# Patient Record
Sex: FEMALE | Race: WHITE
[De-identification: names, ages, dates, MRNs, and addresses within clinical notes are randomized per-mention and may not be internally consistent; named-entity substitution may affect disease eponyms.]

---

## 2018-07-18 ENCOUNTER — HOSPITAL ENCOUNTER (OUTPATIENT)
Age: 38
End: 2018-07-18
Payer: MEDICAID

## 2018-07-18 DIAGNOSIS — Z12.4: Primary | ICD-10-CM

## 2018-07-18 DIAGNOSIS — Z12.72: ICD-10-CM

## 2018-07-18 PROCEDURE — 88175 CYTOPATH C/V AUTO FLUID REDO: CPT

## 2018-07-18 PROCEDURE — G0145 SCR C/V CYTO,THINLAYER,RESCR: HCPCS

## 2019-05-16 LAB
ANION GAP: 3 (ref 5–15)
APTT PPP: 31 SECONDS (ref 24.1–36.2)
B-HCG SERPL QL: NEGATIVE NEGATIVE
BUN SERPL-MCNC: 8 MG/DL (ref 7–18)
BUN/CREAT RATIO: 10.8 RATIO (ref 10–20)
CALCIUM SERPL-MCNC: 8.5 MG/DL (ref 8.5–10.1)
CARBON DIOXIDE: 26 MMOL/L (ref 21–32)
CHLORIDE: 109 MMOL/L (ref 98–107)
DEPRECATED RDW RBC: 44 FL (ref 35.1–43.9)
ERYTHROCYTE [DISTWIDTH] IN BLOOD: 12.8 % (ref 11.6–14.6)
EST GLOM FILT RATE - AFR AMER: 111 ML/MIN (ref 60–?)
GLUCOSE: 79 MG/DL (ref 74–106)
HCG SERPL QL: NEGATIVE NEGATIVE
HCT VFR BLD AUTO: 42.8 % (ref 37–47)
HEMOGLOBIN: 14.6 G/DL (ref 12–15)
HGB BLD-MCNC: 14.6 G/DL (ref 12–15)
INTERNAL QC VALIDATED?: (no result)
MCV RBC: 94.1 FL (ref 81–99)
MEAN CORP HGB CONC: 34.1 G/GL (ref 32–36)
MEAN PLATELET VOL.: 10.3 FL (ref 6.2–12)
PLATELET # BLD: 230 K/MM3 (ref 150–450)
PLATELET COUNT: 230 K/MM3 (ref 150–450)
POTASSIUM: 3.8 MMOL/L (ref 3.5–5.1)
PROTHROMBIN TIME (PROTIME)PT.: 13.5 SECONDS (ref 11.7–14.9)
RBC # BLD AUTO: 4.55 M/MM3 (ref 4.2–5.4)
RBC DISTRIBUTION WIDTH CV: 12.8 % (ref 11.6–14.6)
RBC DISTRIBUTION WIDTH SD: 44 FL (ref 35.1–43.9)
SCAN INDICATED ON CBC? Y/N: NO
WBC # BLD AUTO: 10.4 K/MM3 (ref 4.4–11)
WHITE BLOOD COUNT: 10.4 K/MM3 (ref 4.4–11)

## 2019-05-22 ENCOUNTER — HOSPITAL ENCOUNTER (OUTPATIENT)
Dept: HOSPITAL 100 - MS3 | Age: 39
Setting detail: OBSERVATION
LOS: 1 days | Discharge: HOME | End: 2019-05-23
Payer: MEDICAID

## 2019-05-22 VITALS
RESPIRATION RATE: 16 BRPM | SYSTOLIC BLOOD PRESSURE: 90 MMHG | HEART RATE: 55 BPM | OXYGEN SATURATION: 100 % | DIASTOLIC BLOOD PRESSURE: 50 MMHG

## 2019-05-22 VITALS
HEART RATE: 62 BPM | SYSTOLIC BLOOD PRESSURE: 84 MMHG | DIASTOLIC BLOOD PRESSURE: 64 MMHG | RESPIRATION RATE: 16 BRPM | TEMPERATURE: 97.7 F | OXYGEN SATURATION: 94 %

## 2019-05-22 VITALS
RESPIRATION RATE: 16 BRPM | HEART RATE: 48 BPM | SYSTOLIC BLOOD PRESSURE: 90 MMHG | DIASTOLIC BLOOD PRESSURE: 64 MMHG | OXYGEN SATURATION: 100 %

## 2019-05-22 VITALS
RESPIRATION RATE: 16 BRPM | DIASTOLIC BLOOD PRESSURE: 64 MMHG | OXYGEN SATURATION: 100 % | HEART RATE: 60 BPM | SYSTOLIC BLOOD PRESSURE: 90 MMHG

## 2019-05-22 VITALS
SYSTOLIC BLOOD PRESSURE: 90 MMHG | OXYGEN SATURATION: 100 % | HEART RATE: 62 BPM | RESPIRATION RATE: 16 BRPM | DIASTOLIC BLOOD PRESSURE: 64 MMHG | TEMPERATURE: 98.8 F

## 2019-05-22 VITALS
TEMPERATURE: 97.52 F | RESPIRATION RATE: 16 BRPM | OXYGEN SATURATION: 99 % | HEART RATE: 57 BPM | DIASTOLIC BLOOD PRESSURE: 58 MMHG | SYSTOLIC BLOOD PRESSURE: 94 MMHG

## 2019-05-22 VITALS
RESPIRATION RATE: 16 BRPM | OXYGEN SATURATION: 16 % | HEART RATE: 40 BPM | SYSTOLIC BLOOD PRESSURE: 73 MMHG | DIASTOLIC BLOOD PRESSURE: 47 MMHG | TEMPERATURE: 96.8 F

## 2019-05-22 VITALS
DIASTOLIC BLOOD PRESSURE: 48 MMHG | OXYGEN SATURATION: 100 % | HEART RATE: 52 BPM | RESPIRATION RATE: 16 BRPM | SYSTOLIC BLOOD PRESSURE: 85 MMHG

## 2019-05-22 VITALS
RESPIRATION RATE: 16 BRPM | OXYGEN SATURATION: 98 % | DIASTOLIC BLOOD PRESSURE: 48 MMHG | TEMPERATURE: 98.2 F | HEART RATE: 50 BPM | SYSTOLIC BLOOD PRESSURE: 81 MMHG

## 2019-05-22 VITALS
RESPIRATION RATE: 20 BRPM | OXYGEN SATURATION: 99 % | HEART RATE: 60 BPM | TEMPERATURE: 97.7 F | SYSTOLIC BLOOD PRESSURE: 87 MMHG | DIASTOLIC BLOOD PRESSURE: 50 MMHG

## 2019-05-22 VITALS
OXYGEN SATURATION: 97 % | HEART RATE: 44 BPM | SYSTOLIC BLOOD PRESSURE: 83 MMHG | TEMPERATURE: 98.24 F | DIASTOLIC BLOOD PRESSURE: 50 MMHG | RESPIRATION RATE: 16 BRPM

## 2019-05-22 VITALS — BODY MASS INDEX: 19.5 KG/M2

## 2019-05-22 DIAGNOSIS — N83.11: ICD-10-CM

## 2019-05-22 DIAGNOSIS — N83.12: ICD-10-CM

## 2019-05-22 DIAGNOSIS — N88.8: ICD-10-CM

## 2019-05-22 DIAGNOSIS — G89.29: ICD-10-CM

## 2019-05-22 DIAGNOSIS — K21.9: ICD-10-CM

## 2019-05-22 DIAGNOSIS — Z79.899: ICD-10-CM

## 2019-05-22 DIAGNOSIS — E78.00: ICD-10-CM

## 2019-05-22 DIAGNOSIS — N80.0: Primary | ICD-10-CM

## 2019-05-22 DIAGNOSIS — F17.200: ICD-10-CM

## 2019-05-22 LAB — INTERNAL QC VALIDATED?: (no result)

## 2019-05-22 PROCEDURE — 84703 CHORIONIC GONADOTROPIN ASSAY: CPT

## 2019-05-22 PROCEDURE — 86900 BLOOD TYPING SEROLOGIC ABO: CPT

## 2019-05-22 PROCEDURE — 58552 LAPARO-VAG HYST INCL T/O: CPT

## 2019-05-22 PROCEDURE — 96361 HYDRATE IV INFUSION ADD-ON: CPT

## 2019-05-22 PROCEDURE — 99218: CPT

## 2019-05-22 PROCEDURE — 93005 ELECTROCARDIOGRAM TRACING: CPT

## 2019-05-22 PROCEDURE — 81025 URINE PREGNANCY TEST: CPT

## 2019-05-22 PROCEDURE — 96365 THER/PROPH/DIAG IV INF INIT: CPT

## 2019-05-22 PROCEDURE — 82565 ASSAY OF CREATININE: CPT

## 2019-05-22 PROCEDURE — 96366 THER/PROPH/DIAG IV INF ADDON: CPT

## 2019-05-22 PROCEDURE — 80048 BASIC METABOLIC PNL TOTAL CA: CPT

## 2019-05-22 PROCEDURE — 85730 THROMBOPLASTIN TIME PARTIAL: CPT

## 2019-05-22 PROCEDURE — G0379 DIRECT REFER HOSPITAL OBSERV: HCPCS

## 2019-05-22 PROCEDURE — 88307 TISSUE EXAM BY PATHOLOGIST: CPT

## 2019-05-22 PROCEDURE — G0378 HOSPITAL OBSERVATION PER HR: HCPCS

## 2019-05-22 PROCEDURE — 86850 RBC ANTIBODY SCREEN: CPT

## 2019-05-22 PROCEDURE — 85610 PROTHROMBIN TIME: CPT

## 2019-05-22 PROCEDURE — 99406 BEHAV CHNG SMOKING 3-10 MIN: CPT

## 2019-05-22 PROCEDURE — 36415 COLL VENOUS BLD VENIPUNCTURE: CPT

## 2019-05-22 PROCEDURE — 96376 TX/PRO/DX INJ SAME DRUG ADON: CPT

## 2019-05-22 PROCEDURE — 96375 TX/PRO/DX INJ NEW DRUG ADDON: CPT

## 2019-05-22 PROCEDURE — 0UT9FZZ RESECTION OF UTERUS, VIA NATURAL OR ARTIFICIAL OPENING WITH PERCUTANEOUS ENDOSCOPIC ASSISTANCE: ICD-10-PCS | Performed by: OBSTETRICS & GYNECOLOGY

## 2019-05-22 PROCEDURE — 85027 COMPLETE CBC AUTOMATED: CPT

## 2019-05-22 RX ADMIN — KETOROLAC TROMETHAMINE 30 MG: 30 INJECTION, SOLUTION INTRAMUSCULAR; INTRAVENOUS at 18:00

## 2019-05-22 RX ADMIN — CEFAZOLIN SODIUM 150 GM: 1 INJECTION, SOLUTION INTRAVENOUS at 16:35

## 2019-05-22 RX ADMIN — ORPHENADRINE CITRATE 100 MG: 100 TABLET, EXTENDED RELEASE ORAL at 22:18

## 2019-05-22 RX ADMIN — KETOROLAC TROMETHAMINE 30 MG: 30 INJECTION, SOLUTION INTRAMUSCULAR; INTRAVENOUS at 11:58

## 2019-05-23 VITALS
RESPIRATION RATE: 18 BRPM | OXYGEN SATURATION: 97 % | DIASTOLIC BLOOD PRESSURE: 51 MMHG | SYSTOLIC BLOOD PRESSURE: 91 MMHG | TEMPERATURE: 98.6 F | HEART RATE: 43 BPM

## 2019-05-23 VITALS
SYSTOLIC BLOOD PRESSURE: 84 MMHG | HEART RATE: 45 BPM | TEMPERATURE: 98.2 F | DIASTOLIC BLOOD PRESSURE: 47 MMHG | OXYGEN SATURATION: 96 % | RESPIRATION RATE: 18 BRPM

## 2019-05-23 VITALS
RESPIRATION RATE: 18 BRPM | HEART RATE: 42 BPM | TEMPERATURE: 98.06 F | SYSTOLIC BLOOD PRESSURE: 86 MMHG | DIASTOLIC BLOOD PRESSURE: 48 MMHG | OXYGEN SATURATION: 99 %

## 2019-05-23 VITALS — OXYGEN SATURATION: 96 %

## 2019-05-23 VITALS — HEART RATE: 42 BPM

## 2019-05-23 LAB
DEPRECATED RDW RBC: 40.7 FL (ref 35.1–43.9)
ERYTHROCYTE [DISTWIDTH] IN BLOOD: 12.4 % (ref 11.6–14.6)
EST GLOM FILT RATE - AFR AMER: 102 ML/MIN (ref 60–?)
ESTIMATED CREATININE CLEARANCE: 81.16 ML/MIN
HCT VFR BLD AUTO: 34.3 % (ref 37–47)
HEMOGLOBIN: 11.5 G/DL (ref 12–15)
HGB BLD-MCNC: 11.5 G/DL (ref 12–15)
MCV RBC: 93 FL (ref 81–99)
MEAN CORP HGB CONC: 33.5 G/GL (ref 32–36)
MEAN PLATELET VOL.: 11.6 FL (ref 6.2–12)
PLATELET # BLD: 145 K/MM3 (ref 150–450)
PLATELET COUNT: 145 K/MM3 (ref 150–450)
RBC # BLD AUTO: 3.69 M/MM3 (ref 4.2–5.4)
RBC DISTRIBUTION WIDTH CV: 12.4 % (ref 11.6–14.6)
RBC DISTRIBUTION WIDTH SD: 40.7 FL (ref 35.1–43.9)
SCAN INDICATED ON CBC? Y/N: NO
WBC # BLD AUTO: 18.1 K/MM3 (ref 4.4–11)
WHITE BLOOD COUNT: 18.1 K/MM3 (ref 4.4–11)

## 2019-05-23 RX ADMIN — CEFAZOLIN SODIUM 150 GM: 1 INJECTION, SOLUTION INTRAVENOUS at 01:13

## 2019-05-23 RX ADMIN — KETOROLAC TROMETHAMINE 30 MG: 30 INJECTION, SOLUTION INTRAMUSCULAR; INTRAVENOUS at 05:11

## 2019-05-23 RX ADMIN — ESTROGENS, CONJUGATED 1.25 MG: 1.25 TABLET, FILM COATED ORAL at 10:50

## 2019-05-23 RX ADMIN — KETOROLAC TROMETHAMINE 30 MG: 30 INJECTION, SOLUTION INTRAMUSCULAR; INTRAVENOUS at 00:57

## 2019-05-30 ENCOUNTER — HOSPITAL ENCOUNTER (OUTPATIENT)
Age: 39
End: 2019-05-30
Payer: MEDICAID

## 2019-05-30 VITALS — BODY MASS INDEX: 19.5 KG/M2

## 2019-05-30 DIAGNOSIS — R30.0: Primary | ICD-10-CM

## 2019-05-30 PROCEDURE — 87086 URINE CULTURE/COLONY COUNT: CPT

## 2021-08-24 ENCOUNTER — HOSPITAL ENCOUNTER (OUTPATIENT)
Age: 41
Discharge: HOME | End: 2021-08-24
Payer: MEDICARE

## 2021-08-24 DIAGNOSIS — N76.0: Primary | ICD-10-CM

## 2021-08-24 DIAGNOSIS — Z11.3: ICD-10-CM

## 2021-08-24 DIAGNOSIS — N76.1: ICD-10-CM

## 2022-04-01 ENCOUNTER — HOSPITAL ENCOUNTER (OUTPATIENT)
Age: 42
Discharge: HOME | End: 2022-04-01
Payer: MEDICARE

## 2022-04-01 DIAGNOSIS — N77.1: ICD-10-CM

## 2022-04-01 DIAGNOSIS — Z11.3: Primary | ICD-10-CM

## 2022-07-18 ENCOUNTER — HOSPITAL ENCOUNTER (OUTPATIENT)
Age: 42
Discharge: HOME | End: 2022-07-18
Payer: MEDICARE

## 2022-07-18 DIAGNOSIS — R30.0: Primary | ICD-10-CM

## 2022-07-18 DIAGNOSIS — N76.1: ICD-10-CM

## 2022-07-18 PROCEDURE — 87086 URINE CULTURE/COLONY COUNT: CPT

## 2022-07-18 PROCEDURE — 87088 URINE BACTERIA CULTURE: CPT

## 2022-08-24 ENCOUNTER — HOSPITAL ENCOUNTER (OUTPATIENT)
Age: 42
Discharge: HOME | End: 2022-08-24
Payer: MEDICARE

## 2022-08-24 DIAGNOSIS — N76.0: Primary | ICD-10-CM

## 2023-06-12 ENCOUNTER — OFFICE VISIT (OUTPATIENT)
Dept: PRIMARY CARE | Facility: CLINIC | Age: 43
End: 2023-06-12
Payer: MEDICARE

## 2023-06-12 ENCOUNTER — LAB (OUTPATIENT)
Dept: LAB | Facility: LAB | Age: 43
End: 2023-06-12
Payer: MEDICARE

## 2023-06-12 VITALS
SYSTOLIC BLOOD PRESSURE: 122 MMHG | DIASTOLIC BLOOD PRESSURE: 70 MMHG | WEIGHT: 109.9 LBS | HEIGHT: 66 IN | HEART RATE: 83 BPM | BODY MASS INDEX: 17.66 KG/M2

## 2023-06-12 DIAGNOSIS — E46 PROTEIN-CALORIE MALNUTRITION, UNSPECIFIED SEVERITY (MULTI): ICD-10-CM

## 2023-06-12 DIAGNOSIS — Z86.39 H/O THYROID NODULE: ICD-10-CM

## 2023-06-12 DIAGNOSIS — G89.29 CHRONIC BILATERAL THORACIC BACK PAIN: ICD-10-CM

## 2023-06-12 DIAGNOSIS — N95.1 HOT FLASHES, MENOPAUSAL: Primary | ICD-10-CM

## 2023-06-12 DIAGNOSIS — Z90.710 STATUS POST TOTAL HYSTERECTOMY: ICD-10-CM

## 2023-06-12 DIAGNOSIS — I49.5 SICK SINUS SYNDROME DUE TO SA NODE DYSFUNCTION (MULTI): ICD-10-CM

## 2023-06-12 DIAGNOSIS — M54.6 CHRONIC BILATERAL THORACIC BACK PAIN: ICD-10-CM

## 2023-06-12 DIAGNOSIS — N95.1 HOT FLASHES, MENOPAUSAL: ICD-10-CM

## 2023-06-12 PROBLEM — Q21.12 PFO (PATENT FORAMEN OVALE) (HHS-HCC): Status: ACTIVE | Noted: 2023-06-12

## 2023-06-12 PROBLEM — K52.9 COLITIS: Status: ACTIVE | Noted: 2023-06-12

## 2023-06-12 PROBLEM — I63.81 LACUNAR INFARCTION (MULTI): Status: ACTIVE | Noted: 2023-06-12

## 2023-06-12 PROBLEM — R11.2 NAUSEA AND VOMITING: Status: ACTIVE | Noted: 2023-06-12

## 2023-06-12 PROBLEM — R00.1 BRADYCARDIA: Status: ACTIVE | Noted: 2023-06-12

## 2023-06-12 PROBLEM — R00.2 HEART PALPITATIONS: Status: ACTIVE | Noted: 2023-06-12

## 2023-06-12 PROBLEM — R53.83 FATIGUE: Status: ACTIVE | Noted: 2023-06-12

## 2023-06-12 PROBLEM — M23.90 DERANGEMENT OF KNEE: Status: ACTIVE | Noted: 2023-06-12

## 2023-06-12 PROBLEM — K76.9 LIVER LESION: Status: ACTIVE | Noted: 2023-06-12

## 2023-06-12 PROBLEM — K21.9 GERD (GASTROESOPHAGEAL REFLUX DISEASE): Status: ACTIVE | Noted: 2023-06-12

## 2023-06-12 PROBLEM — Z95.818 STATUS POST PLACEMENT OF IMPLANTABLE LOOP RECORDER: Status: ACTIVE | Noted: 2023-06-12

## 2023-06-12 PROBLEM — M79.7 FIBROMYALGIA: Status: ACTIVE | Noted: 2023-06-12

## 2023-06-12 PROBLEM — G96.198 SPINAL ARACHNOID CYST: Status: ACTIVE | Noted: 2023-06-12

## 2023-06-12 PROBLEM — R89.9 ABNORMAL LABORATORY TEST: Status: ACTIVE | Noted: 2023-06-12

## 2023-06-12 PROBLEM — B00.1 RECURRENT COLD SORES: Status: ACTIVE | Noted: 2023-06-12

## 2023-06-12 PROBLEM — B37.31 YEAST VAGINITIS: Status: ACTIVE | Noted: 2023-06-12

## 2023-06-12 PROBLEM — I67.9 CEREBROVASCULAR DISEASE: Status: ACTIVE | Noted: 2023-06-12

## 2023-06-12 PROBLEM — G47.19 EXCESSIVE DAYTIME SLEEPINESS: Status: ACTIVE | Noted: 2023-06-12

## 2023-06-12 PROBLEM — M51.26 LUMBAR HERNIATED DISC: Status: ACTIVE | Noted: 2023-06-12

## 2023-06-12 PROBLEM — K59.09 CHRONIC CONSTIPATION: Status: ACTIVE | Noted: 2023-06-12

## 2023-06-12 PROBLEM — M48.02 SPINAL STENOSIS OF CERVICAL REGION: Status: ACTIVE | Noted: 2023-06-12

## 2023-06-12 PROBLEM — E04.1 THYROID NODULE: Status: ACTIVE | Noted: 2023-06-12

## 2023-06-12 PROBLEM — M54.9 BACK PAIN: Status: ACTIVE | Noted: 2023-06-12

## 2023-06-12 PROBLEM — F17.200 SMOKER: Status: ACTIVE | Noted: 2023-06-12

## 2023-06-12 LAB
ALANINE AMINOTRANSFERASE (SGPT) (U/L) IN SER/PLAS: 18 U/L (ref 7–45)
ALBUMIN (G/DL) IN SER/PLAS: 4.7 G/DL (ref 3.4–5)
ALKALINE PHOSPHATASE (U/L) IN SER/PLAS: 80 U/L (ref 33–110)
ANION GAP IN SER/PLAS: 10 MMOL/L (ref 10–20)
ASPARTATE AMINOTRANSFERASE (SGOT) (U/L) IN SER/PLAS: 20 U/L (ref 9–39)
BILIRUBIN TOTAL (MG/DL) IN SER/PLAS: 0.7 MG/DL (ref 0–1.2)
CALCIUM (MG/DL) IN SER/PLAS: 9.2 MG/DL (ref 8.6–10.3)
CARBON DIOXIDE, TOTAL (MMOL/L) IN SER/PLAS: 26 MMOL/L (ref 21–32)
CHLORIDE (MMOL/L) IN SER/PLAS: 109 MMOL/L (ref 98–107)
CREATININE (MG/DL) IN SER/PLAS: 0.79 MG/DL (ref 0.5–1.05)
GFR FEMALE: >90 ML/MIN/1.73M2
GLUCOSE (MG/DL) IN SER/PLAS: 94 MG/DL (ref 74–99)
POTASSIUM (MMOL/L) IN SER/PLAS: 3.7 MMOL/L (ref 3.5–5.3)
PROTEIN TOTAL: 6.8 G/DL (ref 6.4–8.2)
SODIUM (MMOL/L) IN SER/PLAS: 141 MMOL/L (ref 136–145)
THYROTROPIN (MIU/L) IN SER/PLAS BY DETECTION LIMIT <= 0.05 MIU/L: 1.14 MIU/L (ref 0.44–3.98)
UREA NITROGEN (MG/DL) IN SER/PLAS: 14 MG/DL (ref 6–23)

## 2023-06-12 PROCEDURE — 99214 OFFICE O/P EST MOD 30 MIN: CPT | Performed by: NURSE PRACTITIONER

## 2023-06-12 PROCEDURE — 80053 COMPREHEN METABOLIC PANEL: CPT

## 2023-06-12 PROCEDURE — 84443 ASSAY THYROID STIM HORMONE: CPT

## 2023-06-12 PROCEDURE — 84146 ASSAY OF PROLACTIN: CPT

## 2023-06-12 PROCEDURE — 36415 COLL VENOUS BLD VENIPUNCTURE: CPT

## 2023-06-12 PROCEDURE — 83001 ASSAY OF GONADOTROPIN (FSH): CPT

## 2023-06-12 RX ORDER — PAROXETINE 10 MG/1
10 TABLET, FILM COATED ORAL EVERY MORNING
Qty: 30 TABLET | Refills: 5 | Status: SHIPPED | OUTPATIENT
Start: 2023-06-12 | End: 2024-01-23 | Stop reason: WASHOUT

## 2023-06-12 ASSESSMENT — ENCOUNTER SYMPTOMS: ACTIVITY CHANGE: 0

## 2023-06-12 NOTE — PROGRESS NOTES
"Subjective   Patient ID: Rosalba Jenkins is a 42 y.o. female who presents for Hot Flashes.    Hot flashes: Complete hysterectomy 5 years ago. Was initially started on hormone replacement however she forgot to take it. She was asymptomatic so she never took it  Complains of hot/burning feeling with any activity; happening at night preventing her form sleeping.  Having fatigue: unable to sleep  Has \"terrible back problem\"  Is currently on disability for back pain: was seeing a neurosurgeon in [place]. \"He left\"  Has not seen neuro surgery since 2021  Has documents showing left nodule of her thyroids    Today here for multiple complains  She complains of pain, hot flashes, difficulty walking, difficulty sleeping, chronic back pain  Reviewed MRI form 2019 of L-spine, C-spine, and T-spine. She will need to reestablish with a neurosurgeon, she reports hasn't seen one in a few years    Had loop recorder in place, follows with cardiology in [place]  Follows with GYN in [place], has recurring BV      H/O low potassium: unknown cause         Review of Systems   Constitutional:  Positive for appetite change (reports poor appetite) and fatigue. Negative for activity change and fever.   HENT: Negative.     Eyes:  Negative for visual disturbance.   Respiratory:  Negative for cough, chest tightness, shortness of breath and wheezing.    Cardiovascular:  Negative for chest pain, palpitations and leg swelling.   Gastrointestinal:  Negative for constipation, diarrhea, nausea and vomiting.   Endocrine: Negative for cold intolerance, heat intolerance, polydipsia, polyphagia and polyuria.   Genitourinary:  Negative for difficulty urinating, pelvic pain and vaginal discharge.   Musculoskeletal:  Positive for arthralgias, back pain and myalgias. Negative for neck stiffness.   Skin: Negative.    Neurological:  Negative for dizziness, weakness, light-headedness, numbness and headaches.   Psychiatric/Behavioral:  The patient is " "nervous/anxious.        Objective   /70 (Patient Position: Sitting)   Pulse 83   Ht 1.664 m (5' 5.5\")   Wt 49.9 kg (109 lb 14.4 oz)   BMI 18.01 kg/m²     Physical Exam  Constitutional:       General: She is not in acute distress.     Appearance: Normal appearance.      Comments: Appears underweight     HENT:      Head: Normocephalic.   Eyes:      Pupils: Pupils are equal, round, and reactive to light.   Cardiovascular:      Rate and Rhythm: Normal rate and regular rhythm.      Pulses: Normal pulses.   Pulmonary:      Effort: Pulmonary effort is normal.      Breath sounds: Normal breath sounds.   Abdominal:      General: Bowel sounds are normal.      Palpations: Abdomen is soft.   Musculoskeletal:      Cervical back: Normal range of motion.      Right lower leg: No edema.      Left lower leg: No edema.   Skin:     General: Skin is warm and dry.      Capillary Refill: Capillary refill takes less than 2 seconds.   Neurological:      Mental Status: She is alert and oriented to person, place, and time.   Psychiatric:         Attention and Perception: Attention normal.         Mood and Affect: Mood is anxious. Affect is tearful.         Speech: Speech normal.         Behavior: Behavior normal.         Assessment/Plan   Diagnoses and all orders for this visit:  Hot flashes, menopausal  -     FSH; Future  -     Prolactin level; Future  -     TSH with reflex to Free T4 if abnormal; Future  -     Comprehensive Metabolic Panel; Future  -     PARoxetine (Paxil) 10 mg tablet; Take 1 tablet (10 mg) by mouth once daily in the morning.  Status post total hysterectomy  -     FSH; Future  -     Prolactin level; Future  Chronic bilateral thoracic back pain   -Will need referral to neurosurgery.   H/O thyroid nodule  -     TSH with reflex to Free T4 if abnormal; Future  -     US thyroid; Future  Protein-calorie malnutrition, unspecified severity (CMS/HCC)  -     Comprehensive Metabolic Panel; Future  Sick sinus syndrome due " to SA node dysfunction (CMS/HCC)  -     Comprehensive Metabolic Panel; Future  Other orders  -     Follow Up In Primary Care; Future

## 2023-06-13 ENCOUNTER — TELEPHONE (OUTPATIENT)
Dept: PRIMARY CARE | Facility: CLINIC | Age: 43
End: 2023-06-13
Payer: MEDICARE

## 2023-06-13 LAB
FOLLITROPIN (IU/L) IN SER/PLAS: 121.1 IU/L
PROLACTIN (UG/L) IN SER/PLAS: 7.5 UG/L (ref 3–20)

## 2023-06-13 ASSESSMENT — ENCOUNTER SYMPTOMS
DIZZINESS: 0
FATIGUE: 1
DIFFICULTY URINATING: 0
FEVER: 0
DIARRHEA: 0
COUGH: 0
LIGHT-HEADEDNESS: 0
VOMITING: 0
POLYPHAGIA: 0
NAUSEA: 0
POLYDIPSIA: 0
BACK PAIN: 1
WHEEZING: 0
MYALGIAS: 1
NUMBNESS: 0
NERVOUS/ANXIOUS: 1
CONSTIPATION: 0
NECK STIFFNESS: 0
WEAKNESS: 0
PALPITATIONS: 0
CHEST TIGHTNESS: 0
ARTHRALGIAS: 1
SHORTNESS OF BREATH: 0
HEADACHES: 0
APPETITE CHANGE: 1

## 2023-06-14 ENCOUNTER — TELEPHONE (OUTPATIENT)
Dept: PRIMARY CARE | Facility: CLINIC | Age: 43
End: 2023-06-14
Payer: MEDICARE

## 2023-06-14 NOTE — TELEPHONE ENCOUNTER
6/14/23  Rosalba returned Cristiano's call regarding her lab results.  Please give her a call back as soon as possible.

## 2023-06-15 ENCOUNTER — TELEPHONE (OUTPATIENT)
Dept: PRIMARY CARE | Facility: CLINIC | Age: 43
End: 2023-06-15
Payer: MEDICARE

## 2023-06-15 DIAGNOSIS — Z90.710 STATUS POST TOTAL HYSTERECTOMY: ICD-10-CM

## 2023-06-15 DIAGNOSIS — Z12.31 ENCOUNTER FOR SCREENING MAMMOGRAM FOR MALIGNANT NEOPLASM OF BREAST: ICD-10-CM

## 2023-06-15 DIAGNOSIS — N95.1 HOT FLASHES, MENOPAUSAL: Primary | ICD-10-CM

## 2023-06-15 PROBLEM — Q21.12 PFO (PATENT FORAMEN OVALE) (HHS-HCC): Status: RESOLVED | Noted: 2023-06-12 | Resolved: 2023-06-15

## 2023-06-15 RX ORDER — ESTRADIOL 0.5 MG/1
0.5 TABLET ORAL 3 TIMES WEEKLY
Qty: 12 TABLET | Refills: 11 | Status: SHIPPED | OUTPATIENT
Start: 2023-06-16 | End: 2023-06-15

## 2023-06-15 NOTE — TELEPHONE ENCOUNTER
Spoke with patient, reviewed all recent labs, labs were unremarkable.   Due to symptomatic menopause, will start her on Estradiol three times weekly, let her know she will need a mammogram as she has not had one to date.   Patient agrees to get mammogram.      EDIT: will not prescribe Estradiol due to H/O Lacunar infarction, thsi is contraindicated.

## 2023-07-24 ENCOUNTER — APPOINTMENT (OUTPATIENT)
Dept: PRIMARY CARE | Facility: CLINIC | Age: 43
End: 2023-07-24
Payer: MEDICARE

## 2023-08-24 ENCOUNTER — TELEPHONE (OUTPATIENT)
Dept: PRIMARY CARE | Facility: CLINIC | Age: 43
End: 2023-08-24
Payer: MEDICARE

## 2023-09-06 ENCOUNTER — TELEPHONE (OUTPATIENT)
Dept: PRIMARY CARE | Facility: CLINIC | Age: 43
End: 2023-09-06

## 2023-09-07 ENCOUNTER — TELEPHONE (OUTPATIENT)
Dept: PRIMARY CARE | Facility: CLINIC | Age: 43
End: 2023-09-07
Payer: MEDICARE

## 2023-09-07 NOTE — TELEPHONE ENCOUNTER
Reviewed  thyroid US result with patient, nodules are subcentimeter and no change from previous US  Patient verbalizes understanding

## 2023-09-25 ENCOUNTER — TELEPHONE (OUTPATIENT)
Dept: PRIMARY CARE | Facility: CLINIC | Age: 43
End: 2023-09-25
Payer: MEDICARE

## 2023-09-25 DIAGNOSIS — B37.31 VAGINAL YEAST INFECTION: Primary | ICD-10-CM

## 2023-09-25 NOTE — TELEPHONE ENCOUNTER
Asking for a RX for diflucan with the directions as take 1 then take 1 in 3 days. Her GYN was in [place], but they closed and she's not sure where to go. Asking for a call back.

## 2023-09-26 RX ORDER — FLUCONAZOLE 150 MG/1
TABLET ORAL
Qty: 2 TABLET | Refills: 0 | Status: SHIPPED | OUTPATIENT
Start: 2023-09-26 | End: 2023-12-13 | Stop reason: SDUPTHER

## 2023-12-13 ENCOUNTER — TELEPHONE (OUTPATIENT)
Dept: PRIMARY CARE | Facility: CLINIC | Age: 43
End: 2023-12-13
Payer: MEDICARE

## 2023-12-13 DIAGNOSIS — B37.31 VAGINAL YEAST INFECTION: ICD-10-CM

## 2023-12-13 RX ORDER — FLUCONAZOLE 150 MG/1
TABLET ORAL
Qty: 2 TABLET | Refills: 0 | Status: SHIPPED | OUTPATIENT
Start: 2023-12-13 | End: 2024-01-23 | Stop reason: WASHOUT

## 2023-12-13 NOTE — TELEPHONE ENCOUNTER
Called and requested another RX of Diflucan, she did not leave a pharmacy.   Please call and let her know if this is possible

## 2024-01-23 ENCOUNTER — OFFICE VISIT (OUTPATIENT)
Dept: OBSTETRICS AND GYNECOLOGY | Facility: CLINIC | Age: 44
End: 2024-01-23
Payer: MEDICARE

## 2024-01-23 VITALS — SYSTOLIC BLOOD PRESSURE: 108 MMHG | WEIGHT: 110.8 LBS | BODY MASS INDEX: 18.16 KG/M2 | DIASTOLIC BLOOD PRESSURE: 62 MMHG

## 2024-01-23 DIAGNOSIS — N76.0 ACUTE VAGINITIS: Primary | ICD-10-CM

## 2024-01-23 PROCEDURE — 87070 CULTURE OTHR SPECIMN AEROBIC: CPT

## 2024-01-23 NOTE — PROGRESS NOTES
Subjective   Patient ID: Rosalba Jenkins is a 43 y.o. female who presents for New Patient Visit (Patient is here as a New Patient for possible yeast infection. Patient had thick white discharge yesterday but nothing today. Patient states she has Diflucan and Flagyl medications with refills to take when she feels her PH level is off which happens frequently. Patient states she took both medications on Friday. ).  HPI  Rosalba is a 43-year-old woman who reports that over the last 2 years she has been finding recurrent bouts of bacterial vaginosis.  She reports that her previous gynecologist who was in [place] and has left the area this last year gave her a standing order for Flagyl for recurrent BV.  He advised her to take 1 tablet of Flagyl every time she had intercourse.  The records are not available to us.  She reports that last week she took 3 days of Flagyl 500 mg once a day.  She had no improvement in her discharge and itching and called her primary care doctor who gave her 2 doses of fluconazole that she took 3 days apart last week and reports no improvement in her discharge or itching.  She reports she has had a total hysterectomy with bilateral salpingectomy.  Review of Systems    Objective   Physical Exam  Patient in no acute physical distress  Abdomen soft nontender  External genitalia revealed no lesions  The vagina did not appear atrophic it appeared well estrogenized with a normal nonfoul-smelling well estrogenized appearing discharge.    Wet mount was negative for trichomoniasis negative for yeast and negative for clue cells  Assessment/Plan     Rosalba is a 43-year-old woman who comes in with concerns about discharge and itching and reports a history of chronic recurrent bacterial vaginosis which she has been self managing with episodic intake of Flagyl 500 mg daily for a day or up to 3 days.  Explained to Rosalba that if bacterial vaginosis is indeed present by taking a suboptimal treatment she  will select out a resistant strain of Gardnerella.  She was advised that this regimen is not healthy and may be leading to her recurrence of bacterial vaginosis and persistent symptoms.  She reports she cannot tolerate p.o. Flagyl without significant GI upset.  She also reports that she has never been given the option of trying metronidazole clindamycin capsules or oral clindamycin.  At this point advised her that if she comes down with BV again she should request vaginal treatment or oral clindamycin.  Cultures were sent.       Ines cMgrath MD 01/23/24 4:04 PM

## 2024-01-27 LAB — BACTERIA GENITAL AEROBE CULT: NORMAL

## 2024-03-29 ENCOUNTER — TELEPHONE (OUTPATIENT)
Dept: PRIMARY CARE | Facility: CLINIC | Age: 44
End: 2024-03-29
Payer: COMMERCIAL

## 2024-05-09 ENCOUNTER — OFFICE VISIT (OUTPATIENT)
Dept: PRIMARY CARE | Facility: CLINIC | Age: 44
End: 2024-05-09
Payer: MEDICARE

## 2024-05-09 VITALS
OXYGEN SATURATION: 97 % | SYSTOLIC BLOOD PRESSURE: 98 MMHG | DIASTOLIC BLOOD PRESSURE: 78 MMHG | BODY MASS INDEX: 18.16 KG/M2 | HEART RATE: 57 BPM | WEIGHT: 113 LBS | HEIGHT: 66 IN

## 2024-05-09 DIAGNOSIS — I49.5 SICK SINUS SYNDROME DUE TO SA NODE DYSFUNCTION (MULTI): ICD-10-CM

## 2024-05-09 DIAGNOSIS — E55.9 VITAMIN D DEFICIENCY: ICD-10-CM

## 2024-05-09 DIAGNOSIS — Z13.29 SCREENING FOR THYROID DISORDER: ICD-10-CM

## 2024-05-09 DIAGNOSIS — M79.671 RIGHT FOOT PAIN: ICD-10-CM

## 2024-05-09 DIAGNOSIS — G89.29 CHRONIC BILATERAL LOW BACK PAIN, UNSPECIFIED WHETHER SCIATICA PRESENT: ICD-10-CM

## 2024-05-09 DIAGNOSIS — R09.89 DECREASED PULSES IN FEET: ICD-10-CM

## 2024-05-09 DIAGNOSIS — Z00.00 ROUTINE GENERAL MEDICAL EXAMINATION AT HEALTH CARE FACILITY: Primary | ICD-10-CM

## 2024-05-09 DIAGNOSIS — H69.93 DYSFUNCTION OF BOTH EUSTACHIAN TUBES: ICD-10-CM

## 2024-05-09 DIAGNOSIS — M54.50 CHRONIC BILATERAL LOW BACK PAIN, UNSPECIFIED WHETHER SCIATICA PRESENT: ICD-10-CM

## 2024-05-09 DIAGNOSIS — E78.00 ELEVATED CHOLESTEROL: ICD-10-CM

## 2024-05-09 PROBLEM — E46 PROTEIN-CALORIE MALNUTRITION, UNSPECIFIED SEVERITY (MULTI): Status: RESOLVED | Noted: 2023-06-12 | Resolved: 2024-05-09

## 2024-05-09 PROCEDURE — 99214 OFFICE O/P EST MOD 30 MIN: CPT

## 2024-05-09 PROCEDURE — G0439 PPPS, SUBSEQ VISIT: HCPCS

## 2024-05-09 PROCEDURE — 3008F BODY MASS INDEX DOCD: CPT

## 2024-05-09 RX ORDER — CETIRIZINE HYDROCHLORIDE 5 MG/1
5 TABLET ORAL DAILY
COMMUNITY
End: 2024-05-09 | Stop reason: WASHOUT

## 2024-05-09 RX ORDER — CELECOXIB 100 MG/1
100 CAPSULE ORAL 2 TIMES DAILY PRN
Qty: 60 CAPSULE | Refills: 0 | Status: SHIPPED | OUTPATIENT
Start: 2024-05-09 | End: 2024-06-08

## 2024-05-09 RX ORDER — FLUTICASONE PROPIONATE 50 MCG
1 SPRAY, SUSPENSION (ML) NASAL DAILY
Qty: 16 G | Refills: 2 | Status: SHIPPED | OUTPATIENT
Start: 2024-05-09 | End: 2025-05-09

## 2024-05-09 RX ORDER — LORATADINE 10 MG/1
10 TABLET ORAL DAILY
Qty: 30 TABLET | Refills: 2 | Status: SHIPPED | OUTPATIENT
Start: 2024-05-09 | End: 2024-08-07

## 2024-05-09 RX ORDER — METHYLPREDNISOLONE 4 MG/1
TABLET ORAL
Qty: 21 TABLET | Refills: 0 | Status: SHIPPED | OUTPATIENT
Start: 2024-05-09 | End: 2024-05-16

## 2024-05-09 ASSESSMENT — ACTIVITIES OF DAILY LIVING (ADL)
BATHING: INDEPENDENT
GROCERY_SHOPPING: INDEPENDENT
MANAGING_FINANCES: INDEPENDENT
DRESSING: INDEPENDENT
TAKING_MEDICATION: INDEPENDENT
DOING_HOUSEWORK: INDEPENDENT

## 2024-05-09 ASSESSMENT — PATIENT HEALTH QUESTIONNAIRE - PHQ9
1. LITTLE INTEREST OR PLEASURE IN DOING THINGS: NOT AT ALL
2. FEELING DOWN, DEPRESSED OR HOPELESS: NOT AT ALL
SUM OF ALL RESPONSES TO PHQ9 QUESTIONS 1 AND 2: 0

## 2024-05-09 ASSESSMENT — ENCOUNTER SYMPTOMS
GASTROINTESTINAL NEGATIVE: 1
RESPIRATORY NEGATIVE: 1
CONSTITUTIONAL NEGATIVE: 1
CARDIOVASCULAR NEGATIVE: 1

## 2024-05-09 NOTE — PROGRESS NOTES
"Subjective   Patient ID: Rosalba Jenkins is a 43 y.o. female who presents for Medicare Annual Wellness Visit Subsequent (Pt to discuss thyroid issues noticed on MRI, pt having pain in right foot,hard to walk ,  nails hurt, left knee pain, ears have fluid and has pain ).    HPI     Review of Systems    Objective   BP 98/78   Pulse 57   Ht 1.664 m (5' 5.5\")   Wt 51.3 kg (113 lb)   SpO2 97%   BMI 18.52 kg/m²     Physical Exam    Assessment/Plan          "

## 2024-05-09 NOTE — PROGRESS NOTES
"Subjective   Reason for Visit: Rosalba Jenkins is an 43 y.o. female here for a Medicare Wellness visit.     Past Medical, Surgical, and Family History reviewed and updated in chart.    Reviewed all medications by prescribing practitioner or clinical pharmacist (such as prescriptions, OTCs, herbal therapies and supplements) and documented in the medical record.    HPI  Follows with neurosurgery for severe back issues.    Thyroid nodule, US 2023 showed nonsuspicious, will recheck US in 2-3 years.    Endorses chronic allergies, worse this year, BL ears plugged/tinnitus/nasal congestion    Endorses R foot pain chronically, pain is worse to toes and great toe as well as heel. Toenail discoloration as well.    Follow with cardio for hx sick sinus syndrome, has loop recorder    Patient Care Team:  Jaren Mejia PA-C as PCP - General (Family Medicine)     Review of Systems   Constitutional: Negative.    Respiratory: Negative.     Cardiovascular: Negative.    Gastrointestinal: Negative.        Objective   Vitals:  BP 98/78   Pulse 57   Ht 1.664 m (5' 5.5\")   Wt 51.3 kg (113 lb)   SpO2 97%   BMI 18.52 kg/m²       Physical Exam  Constitutional:       General: She is not in acute distress.     Appearance: Normal appearance. She is not ill-appearing.   HENT:      Head: Normocephalic and atraumatic.      Right Ear: Tympanic membrane, ear canal and external ear normal.      Left Ear: Tympanic membrane, ear canal and external ear normal.   Eyes:      Extraocular Movements: Extraocular movements intact.      Conjunctiva/sclera: Conjunctivae normal.   Cardiovascular:      Rate and Rhythm: Normal rate.   Pulmonary:      Effort: Pulmonary effort is normal.   Abdominal:      General: There is no distension.   Musculoskeletal:         General: Tenderness present. Normal range of motion.      Cervical back: Normal range of motion.      Comments: 1+ pulse R dorsalis   Skin:     General: Skin is warm and dry.   Neurological:      " General: No focal deficit present.      Mental Status: She is alert and oriented to person, place, and time.   Psychiatric:         Mood and Affect: Mood normal.         Behavior: Behavior normal.         Thought Content: Thought content normal.         Judgment: Judgment normal.         Assessment/Plan   Problem List Items Addressed This Visit    None       DREW ordered, if negative will refer to podiatry  Rx celebrex  back pain  Rx loratadine/Flonase/medrol dose pack BL eustachian tube dysfunction   Fasting labs today  Follow up 1 month or sooner prn.

## 2024-05-13 ENCOUNTER — HOSPITAL ENCOUNTER (OUTPATIENT)
Dept: VASCULAR MEDICINE | Facility: HOSPITAL | Age: 44
Discharge: HOME | End: 2024-05-13
Payer: MEDICARE

## 2024-05-13 DIAGNOSIS — R09.89 DECREASED PULSES IN FEET: ICD-10-CM

## 2024-05-13 DIAGNOSIS — M79.671 RIGHT FOOT PAIN: ICD-10-CM

## 2024-05-13 PROCEDURE — 93922 UPR/L XTREMITY ART 2 LEVELS: CPT | Performed by: STUDENT IN AN ORGANIZED HEALTH CARE EDUCATION/TRAINING PROGRAM

## 2024-05-13 PROCEDURE — 93922 UPR/L XTREMITY ART 2 LEVELS: CPT

## 2024-05-20 ENCOUNTER — TELEPHONE (OUTPATIENT)
Dept: PRIMARY CARE | Facility: CLINIC | Age: 44
End: 2024-05-20
Payer: COMMERCIAL

## 2024-05-20 DIAGNOSIS — M79.671 RIGHT FOOT PAIN: Primary | ICD-10-CM

## 2024-05-22 ENCOUNTER — TELEPHONE (OUTPATIENT)
Dept: PRIMARY CARE | Facility: CLINIC | Age: 44
End: 2024-05-22
Payer: COMMERCIAL

## 2024-05-22 NOTE — TELEPHONE ENCOUNTER
Patient called in requesting a script for diflucan. She didn't state what symptoms she was having just stated she no longer has an obgyn. I tried calling patient back to ask those questions but got no answer.

## 2024-05-23 DIAGNOSIS — B37.9 YEAST INFECTION: Primary | ICD-10-CM

## 2024-05-23 RX ORDER — FLUCONAZOLE 150 MG/1
150 TABLET ORAL ONCE
Qty: 1 TABLET | Refills: 0 | Status: SHIPPED | OUTPATIENT
Start: 2024-05-23 | End: 2024-05-23

## 2024-06-11 ENCOUNTER — APPOINTMENT (OUTPATIENT)
Dept: PRIMARY CARE | Facility: CLINIC | Age: 44
End: 2024-06-11
Payer: MEDICARE

## 2024-09-23 ENCOUNTER — LAB (OUTPATIENT)
Dept: LAB | Facility: LAB | Age: 44
End: 2024-09-23
Payer: MEDICARE

## 2024-09-23 ENCOUNTER — HOSPITAL ENCOUNTER (OUTPATIENT)
Dept: RADIOLOGY | Facility: CLINIC | Age: 44
Discharge: HOME | End: 2024-09-23
Payer: MEDICARE

## 2024-09-23 ENCOUNTER — APPOINTMENT (OUTPATIENT)
Dept: PRIMARY CARE | Facility: CLINIC | Age: 44
End: 2024-09-23
Payer: MEDICARE

## 2024-09-23 VITALS
WEIGHT: 111 LBS | HEART RATE: 74 BPM | BODY MASS INDEX: 17.84 KG/M2 | OXYGEN SATURATION: 99 % | SYSTOLIC BLOOD PRESSURE: 100 MMHG | DIASTOLIC BLOOD PRESSURE: 68 MMHG | HEIGHT: 66 IN | TEMPERATURE: 98.9 F

## 2024-09-23 DIAGNOSIS — Z13.29 SCREENING FOR THYROID DISORDER: ICD-10-CM

## 2024-09-23 DIAGNOSIS — J30.2 SEASONAL ALLERGIES: ICD-10-CM

## 2024-09-23 DIAGNOSIS — H93.8X3 SENSATION OF FULLNESS IN BOTH EARS: Primary | ICD-10-CM

## 2024-09-23 DIAGNOSIS — R10.11 RIGHT UPPER QUADRANT ABDOMINAL PAIN: ICD-10-CM

## 2024-09-23 DIAGNOSIS — M79.671 RIGHT FOOT PAIN: ICD-10-CM

## 2024-09-23 DIAGNOSIS — H92.03 OTALGIA OF BOTH EARS: ICD-10-CM

## 2024-09-23 DIAGNOSIS — E55.9 VITAMIN D DEFICIENCY: ICD-10-CM

## 2024-09-23 DIAGNOSIS — E78.00 ELEVATED CHOLESTEROL: ICD-10-CM

## 2024-09-23 LAB
25(OH)D3 SERPL-MCNC: 26 NG/ML (ref 30–100)
ALBUMIN SERPL BCP-MCNC: 4.6 G/DL (ref 3.4–5)
ALP SERPL-CCNC: 81 U/L (ref 33–110)
ALT SERPL W P-5'-P-CCNC: 13 U/L (ref 7–45)
ANION GAP SERPL CALC-SCNC: 11 MMOL/L (ref 10–20)
AST SERPL W P-5'-P-CCNC: 16 U/L (ref 9–39)
BASOPHILS # BLD AUTO: 0.05 X10*3/UL (ref 0–0.1)
BASOPHILS NFR BLD AUTO: 0.6 %
BILIRUB SERPL-MCNC: 0.8 MG/DL (ref 0–1.2)
BUN SERPL-MCNC: 13 MG/DL (ref 6–23)
CALCIUM SERPL-MCNC: 9.5 MG/DL (ref 8.6–10.3)
CHLORIDE SERPL-SCNC: 105 MMOL/L (ref 98–107)
CHOLEST SERPL-MCNC: 221 MG/DL (ref 0–199)
CHOLESTEROL/HDL RATIO: 2.4
CO2 SERPL-SCNC: 28 MMOL/L (ref 21–32)
CREAT SERPL-MCNC: 0.89 MG/DL (ref 0.5–1.05)
EGFRCR SERPLBLD CKD-EPI 2021: 82 ML/MIN/1.73M*2
EOSINOPHIL # BLD AUTO: 0.06 X10*3/UL (ref 0–0.7)
EOSINOPHIL NFR BLD AUTO: 0.7 %
ERYTHROCYTE [DISTWIDTH] IN BLOOD BY AUTOMATED COUNT: 13 % (ref 11.5–14.5)
GLUCOSE SERPL-MCNC: 91 MG/DL (ref 74–99)
HCT VFR BLD AUTO: 44.3 % (ref 36–46)
HDLC SERPL-MCNC: 93 MG/DL
HGB BLD-MCNC: 14.9 G/DL (ref 12–16)
IMM GRANULOCYTES # BLD AUTO: 0.03 X10*3/UL (ref 0–0.7)
IMM GRANULOCYTES NFR BLD AUTO: 0.4 % (ref 0–0.9)
LDLC SERPL CALC-MCNC: 114 MG/DL
LIPASE SERPL-CCNC: 34 U/L (ref 9–82)
LYMPHOCYTES # BLD AUTO: 1.99 X10*3/UL (ref 1.2–4.8)
LYMPHOCYTES NFR BLD AUTO: 24.8 %
MCH RBC QN AUTO: 33.2 PG (ref 26–34)
MCHC RBC AUTO-ENTMCNC: 33.6 G/DL (ref 32–36)
MCV RBC AUTO: 99 FL (ref 80–100)
MONOCYTES # BLD AUTO: 0.59 X10*3/UL (ref 0.1–1)
MONOCYTES NFR BLD AUTO: 7.3 %
NEUTROPHILS # BLD AUTO: 5.31 X10*3/UL (ref 1.2–7.7)
NEUTROPHILS NFR BLD AUTO: 66.2 %
NON HDL CHOLESTEROL: 128 MG/DL (ref 0–149)
NRBC BLD-RTO: 0 /100 WBCS (ref 0–0)
PLATELET # BLD AUTO: 221 X10*3/UL (ref 150–450)
POTASSIUM SERPL-SCNC: 4.3 MMOL/L (ref 3.5–5.3)
PROT SERPL-MCNC: 6.5 G/DL (ref 6.4–8.2)
RBC # BLD AUTO: 4.49 X10*6/UL (ref 4–5.2)
SODIUM SERPL-SCNC: 140 MMOL/L (ref 136–145)
TRIGL SERPL-MCNC: 68 MG/DL (ref 0–149)
TSH SERPL-ACNC: 0.83 MIU/L (ref 0.44–3.98)
URATE SERPL-MCNC: 5.8 MG/DL (ref 2.3–6.7)
VIT B12 SERPL-MCNC: 57 PG/ML (ref 211–911)
VLDL: 14 MG/DL (ref 0–40)
WBC # BLD AUTO: 8 X10*3/UL (ref 4.4–11.3)

## 2024-09-23 PROCEDURE — 99214 OFFICE O/P EST MOD 30 MIN: CPT

## 2024-09-23 PROCEDURE — 74018 RADEX ABDOMEN 1 VIEW: CPT

## 2024-09-23 PROCEDURE — 74018 RADEX ABDOMEN 1 VIEW: CPT | Performed by: RADIOLOGY

## 2024-09-23 PROCEDURE — 3008F BODY MASS INDEX DOCD: CPT

## 2024-09-23 RX ORDER — METOCLOPRAMIDE 10 MG/1
10 TABLET ORAL 2 TIMES DAILY PRN
Qty: 20 TABLET | Refills: 0 | Status: SHIPPED | OUTPATIENT
Start: 2024-09-23 | End: 2024-10-03

## 2024-09-23 RX ORDER — CETIRIZINE HYDROCHLORIDE 10 MG/1
10 TABLET ORAL DAILY
Qty: 90 TABLET | Refills: 1 | Status: SHIPPED | OUTPATIENT
Start: 2024-09-23 | End: 2025-03-22

## 2024-09-23 ASSESSMENT — ENCOUNTER SYMPTOMS
DIARRHEA: 1
CONSTIPATION: 1
CARDIOVASCULAR NEGATIVE: 1
RESPIRATORY NEGATIVE: 1
CONSTITUTIONAL NEGATIVE: 1
ABDOMINAL PAIN: 1

## 2024-09-23 ASSESSMENT — PATIENT HEALTH QUESTIONNAIRE - PHQ9
SUM OF ALL RESPONSES TO PHQ9 QUESTIONS 1 AND 2: 0
2. FEELING DOWN, DEPRESSED OR HOPELESS: NOT AT ALL
1. LITTLE INTEREST OR PLEASURE IN DOING THINGS: NOT AT ALL

## 2024-09-23 NOTE — PROGRESS NOTES
"Subjective   Patient ID: Rosalba Jenkins is a 43 y.o. female who presents for pt having irregular BM (Constipation, diarrhea , stool was stringy, watery, , possible hemorrhoid, pt also having ear pain, pressure, would like to see ENT ).    HPI   Endorses 1 year of BL ear fullness/pain and tinnitus, has not tried Flonase, Claritin not helpful.    7 years ago had colonoscopy and found to have colitis. She will have flare ups every few months, greasy food causes pain, RUQ pain, no fever, BM have been varied from constipation to diarrhea, no blood.    Review of Systems   Constitutional: Negative.    Respiratory: Negative.     Cardiovascular: Negative.    Gastrointestinal:  Positive for abdominal pain, constipation and diarrhea.       Objective   /68   Pulse 74   Temp 37.2 °C (98.9 °F)   Ht 1.664 m (5' 5.5\")   Wt 50.3 kg (111 lb)   SpO2 99%   BMI 18.19 kg/m²     Physical Exam  Constitutional:       General: She is not in acute distress.     Appearance: Normal appearance. She is not ill-appearing.   HENT:      Head: Normocephalic and atraumatic.   Eyes:      Extraocular Movements: Extraocular movements intact.      Conjunctiva/sclera: Conjunctivae normal.   Cardiovascular:      Rate and Rhythm: Normal rate.   Pulmonary:      Effort: Pulmonary effort is normal.   Abdominal:      General: There is no distension.      Tenderness: There is abdominal tenderness. There is guarding.   Musculoskeletal:         General: Normal range of motion.      Cervical back: Normal range of motion.   Skin:     General: Skin is warm and dry.   Neurological:      General: No focal deficit present.      Mental Status: She is alert and oriented to person, place, and time.   Psychiatric:         Mood and Affect: Mood normal.         Behavior: Behavior normal.         Thought Content: Thought content normal.         Judgment: Judgment normal.         Assessment/Plan        Labs and abdominal Xray today, will call with results  US " abdomen soon  Rx Reglan  Referral to Dr. Galvan and thank you  Follow up 6 months or sooner prn

## 2024-09-24 ENCOUNTER — HOSPITAL ENCOUNTER (OUTPATIENT)
Dept: RADIOLOGY | Facility: HOSPITAL | Age: 44
Discharge: HOME | End: 2024-09-24
Payer: MEDICARE

## 2024-09-24 DIAGNOSIS — R10.11 RIGHT UPPER QUADRANT ABDOMINAL PAIN: ICD-10-CM

## 2024-09-24 PROCEDURE — 76705 ECHO EXAM OF ABDOMEN: CPT | Performed by: RADIOLOGY

## 2024-09-24 PROCEDURE — 76705 ECHO EXAM OF ABDOMEN: CPT

## 2024-09-26 DIAGNOSIS — K59.00 CONSTIPATION, UNSPECIFIED CONSTIPATION TYPE: Primary | ICD-10-CM

## 2024-09-26 RX ORDER — POLYETHYLENE GLYCOL 3350 17 G/17G
17 POWDER, FOR SOLUTION ORAL DAILY
Qty: 30 PACKET | Refills: 0 | Status: SHIPPED | OUTPATIENT
Start: 2024-09-26 | End: 2024-10-26

## 2024-10-01 ENCOUNTER — APPOINTMENT (OUTPATIENT)
Dept: PRIMARY CARE | Facility: CLINIC | Age: 44
End: 2024-10-01
Payer: MEDICARE

## 2024-10-01 DIAGNOSIS — E53.8 VITAMIN B 12 DEFICIENCY: ICD-10-CM

## 2024-10-01 PROCEDURE — 96372 THER/PROPH/DIAG INJ SC/IM: CPT | Performed by: FAMILY MEDICINE

## 2024-10-01 RX ORDER — CYANOCOBALAMIN 1000 UG/ML
1000 INJECTION, SOLUTION INTRAMUSCULAR; SUBCUTANEOUS ONCE
Status: COMPLETED | OUTPATIENT
Start: 2024-10-01 | End: 2024-10-01

## 2024-10-08 ENCOUNTER — APPOINTMENT (OUTPATIENT)
Dept: PRIMARY CARE | Facility: CLINIC | Age: 44
End: 2024-10-08
Payer: MEDICARE

## 2024-10-08 DIAGNOSIS — E53.8 VITAMIN B 12 DEFICIENCY: ICD-10-CM

## 2024-10-08 PROCEDURE — 96372 THER/PROPH/DIAG INJ SC/IM: CPT

## 2024-10-08 RX ORDER — CYANOCOBALAMIN 1000 UG/ML
1000 INJECTION, SOLUTION INTRAMUSCULAR; SUBCUTANEOUS ONCE
Status: COMPLETED | OUTPATIENT
Start: 2024-10-08 | End: 2024-10-08

## 2024-10-15 ENCOUNTER — CLINICAL SUPPORT (OUTPATIENT)
Dept: PRIMARY CARE | Facility: CLINIC | Age: 44
End: 2024-10-15
Payer: MEDICARE

## 2024-10-15 ENCOUNTER — APPOINTMENT (OUTPATIENT)
Dept: PRIMARY CARE | Facility: CLINIC | Age: 44
End: 2024-10-15
Payer: COMMERCIAL

## 2024-10-15 DIAGNOSIS — E53.8 VITAMIN B 12 DEFICIENCY: ICD-10-CM

## 2024-10-15 PROCEDURE — 96372 THER/PROPH/DIAG INJ SC/IM: CPT

## 2024-10-15 RX ORDER — CYANOCOBALAMIN 1000 UG/ML
1000 INJECTION, SOLUTION INTRAMUSCULAR; SUBCUTANEOUS ONCE
Status: COMPLETED | OUTPATIENT
Start: 2024-10-15 | End: 2024-10-15

## 2024-10-22 ENCOUNTER — APPOINTMENT (OUTPATIENT)
Dept: PRIMARY CARE | Facility: CLINIC | Age: 44
End: 2024-10-22
Payer: COMMERCIAL

## 2024-10-22 DIAGNOSIS — E53.8 VITAMIN B 12 DEFICIENCY: ICD-10-CM

## 2024-10-22 PROCEDURE — 96372 THER/PROPH/DIAG INJ SC/IM: CPT

## 2024-10-22 RX ORDER — CYANOCOBALAMIN 1000 UG/ML
1000 INJECTION, SOLUTION INTRAMUSCULAR; SUBCUTANEOUS ONCE
Status: COMPLETED | OUTPATIENT
Start: 2024-10-22 | End: 2024-10-22

## 2024-10-29 ENCOUNTER — APPOINTMENT (OUTPATIENT)
Dept: PRIMARY CARE | Facility: CLINIC | Age: 44
End: 2024-10-29
Payer: COMMERCIAL

## 2024-10-29 DIAGNOSIS — E53.8 VITAMIN B 12 DEFICIENCY: ICD-10-CM

## 2024-10-29 PROCEDURE — 96372 THER/PROPH/DIAG INJ SC/IM: CPT

## 2024-10-29 RX ORDER — CYANOCOBALAMIN 1000 UG/ML
1000 INJECTION, SOLUTION INTRAMUSCULAR; SUBCUTANEOUS ONCE
Status: COMPLETED | OUTPATIENT
Start: 2024-10-29 | End: 2024-10-29

## 2024-11-05 ENCOUNTER — APPOINTMENT (OUTPATIENT)
Dept: PRIMARY CARE | Facility: CLINIC | Age: 44
End: 2024-11-05
Payer: COMMERCIAL

## 2024-11-05 DIAGNOSIS — E53.8 VITAMIN B 12 DEFICIENCY: ICD-10-CM

## 2024-11-05 PROCEDURE — 96372 THER/PROPH/DIAG INJ SC/IM: CPT

## 2024-11-05 RX ORDER — CYANOCOBALAMIN 1000 UG/ML
1000 INJECTION, SOLUTION INTRAMUSCULAR; SUBCUTANEOUS ONCE
Status: COMPLETED | OUTPATIENT
Start: 2024-11-05 | End: 2024-11-05

## 2024-11-12 ENCOUNTER — TELEPHONE (OUTPATIENT)
Dept: PRIMARY CARE | Facility: CLINIC | Age: 44
End: 2024-11-12

## 2024-11-12 ENCOUNTER — CLINICAL SUPPORT (OUTPATIENT)
Dept: PRIMARY CARE | Facility: CLINIC | Age: 44
End: 2024-11-12
Payer: MEDICARE

## 2024-11-12 DIAGNOSIS — E55.9 VITAMIN D DEFICIENCY: ICD-10-CM

## 2024-11-12 DIAGNOSIS — E53.8 VITAMIN B 12 DEFICIENCY: ICD-10-CM

## 2024-11-12 DIAGNOSIS — E53.8 VITAMIN B 12 DEFICIENCY: Primary | ICD-10-CM

## 2024-11-12 PROCEDURE — 96372 THER/PROPH/DIAG INJ SC/IM: CPT

## 2024-11-12 RX ORDER — CYANOCOBALAMIN 1000 UG/ML
1000 INJECTION, SOLUTION INTRAMUSCULAR; SUBCUTANEOUS ONCE
Status: COMPLETED | OUTPATIENT
Start: 2024-11-12 | End: 2024-11-12

## 2024-11-12 NOTE — TELEPHONE ENCOUNTER
Rosalba has had the six weekly B12 and now starting the monthly injections- would like to know if you'd like to check her B12 level and her vit D?

## 2024-11-21 ENCOUNTER — OFFICE VISIT (OUTPATIENT)
Dept: URGENT CARE | Facility: CLINIC | Age: 44
End: 2024-11-21
Payer: MEDICARE

## 2024-11-21 ENCOUNTER — TELEPHONE (OUTPATIENT)
Dept: PRIMARY CARE | Facility: CLINIC | Age: 44
End: 2024-11-21

## 2024-11-21 VITALS
OXYGEN SATURATION: 98 % | TEMPERATURE: 98.1 F | RESPIRATION RATE: 16 BRPM | HEART RATE: 74 BPM | DIASTOLIC BLOOD PRESSURE: 72 MMHG | SYSTOLIC BLOOD PRESSURE: 103 MMHG

## 2024-11-21 DIAGNOSIS — J01.00 ACUTE NON-RECURRENT MAXILLARY SINUSITIS: Primary | ICD-10-CM

## 2024-11-21 DIAGNOSIS — J20.9 ACUTE BRONCHITIS, UNSPECIFIED ORGANISM: ICD-10-CM

## 2024-11-21 PROCEDURE — 99213 OFFICE O/P EST LOW 20 MIN: CPT | Performed by: NURSE PRACTITIONER

## 2024-11-21 RX ORDER — FLUCONAZOLE 150 MG/1
150 TABLET ORAL ONCE
Qty: 2 TABLET | Refills: 0 | Status: SHIPPED | OUTPATIENT
Start: 2024-11-21 | End: 2024-11-21

## 2024-11-21 RX ORDER — AMOXICILLIN AND CLAVULANATE POTASSIUM 875; 125 MG/1; MG/1
1 TABLET, FILM COATED ORAL 2 TIMES DAILY
Qty: 20 TABLET | Refills: 0 | Status: SHIPPED | OUTPATIENT
Start: 2024-11-21 | End: 2024-12-01

## 2024-11-21 RX ORDER — PREDNISONE 10 MG/1
TABLET ORAL
Qty: 21 TABLET | Refills: 0 | Status: SHIPPED | OUTPATIENT
Start: 2024-11-21 | End: 2024-11-26

## 2024-11-21 RX ORDER — ALBUTEROL SULFATE 90 UG/1
2 INHALANT RESPIRATORY (INHALATION) EVERY 6 HOURS PRN
Qty: 18 G | Refills: 0 | Status: SHIPPED | OUTPATIENT
Start: 2024-11-21 | End: 2025-11-21

## 2024-11-21 NOTE — PROGRESS NOTES
44 y.o. female patient presents for evaluation of sinus pressure. Patient reports 2 weeks of progressively worsening maxillary sinus pain, nasal congestion, cough and headache. There is reported mild postnasal drip and ear pressure. No fever, sore throat, chest pains, SOB, n/v/d, body aches, or other constitutional signs and symptoms. Pt is a smoker. Symptoms have been refractory to over-the-counter medications.      Vitals:    24 1530   BP:    Pulse: 74   Resp: 16   Temp: 36.7 °C (98.1 °F)   SpO2: 98%       Allergies   Allergen Reactions    Hydrocodone-Acetaminophen Itching    Hydrocodone-Guaifenesin Other    Latex Rash       Medication Documentation Review Audit       Reviewed by Iliana Dominguez MA (Medical Assistant) on 24 at 1528      Medication Order Taking? Sig Documenting Provider Last Dose Status   cetirizine (ZyrTEC) 10 mg tablet [id number] Yes Take 1 tablet (10 mg) by mouth once daily. Jaren Mejia PA-C  Active   fluticasone (Flonase) 50 mcg/actuation nasal spray [id number] Yes Administer 1 spray into each nostril once daily. Shake gently. Before first use, prime pump. After use, clean tip and replace cap. Jaren Mejia PA-C  Active   metoclopramide (Reglan) 10 mg tablet [id number]  Take 1 tablet (10 mg) by mouth 2 times a day as needed (nausea) for up to 10 days. Jaren Mejia PA-C   10/03/24 6643                    Past Medical History:   Diagnosis Date    Personal history of transient ischemic attack (TIA), and cerebral infarction without residual deficits 01/15/2021    History of cerebrovascular accident       Past Surgical History:   Procedure Laterality Date    OTHER SURGICAL HISTORY  2019    Colonoscopy    OTHER SURGICAL HISTORY  2019    Laparoscopy    OTHER SURGICAL HISTORY  2019    Tonsillectomy    OTHER SURGICAL HISTORY  2019    Colposcopy    OTHER SURGICAL HISTORY  2019    Ovarian cystectomy    OTHER SURGICAL HISTORY  2019    Tubal ligation     OTHER SURGICAL HISTORY  12/21/2019    Hysteroscopy    OTHER SURGICAL HISTORY  12/21/2019    Total hysterectomy with removal of both tubes and ovaries    OTHER SURGICAL HISTORY  06/24/2020    Loop recorder insertion       ROS  See HPI    Physical Exam  Vitals and nursing note reviewed.   Constitutional:       General: She is not in acute distress.     Appearance: Normal appearance. She is normal weight. She is not ill-appearing, toxic-appearing or diaphoretic.   HENT:      Head: Normocephalic and atraumatic.      Right Ear: Tympanic membrane and ear canal normal.      Left Ear: Tympanic membrane and ear canal normal.      Nose: Congestion present.      Right Sinus: Maxillary sinus tenderness present.      Left Sinus: Maxillary sinus tenderness present.      Mouth/Throat:      Mouth: Mucous membranes are moist.      Pharynx: Oropharynx is clear.   Eyes:      Extraocular Movements: Extraocular movements intact.      Conjunctiva/sclera: Conjunctivae normal.      Pupils: Pupils are equal, round, and reactive to light.   Cardiovascular:      Rate and Rhythm: Normal rate.   Pulmonary:      Effort: Pulmonary effort is normal.      Breath sounds: Rhonchi (cleared with cough) present.      Comments: Moist cough  Lymphadenopathy:      Cervical: Cervical adenopathy present.   Skin:     General: Skin is warm and dry.   Neurological:      General: No focal deficit present.      Mental Status: She is alert and oriented to person, place, and time.   Psychiatric:         Mood and Affect: Mood normal.         Behavior: Behavior normal.           Assessment/Plan/MDM  Rosalba was seen today for ear fullness and sinus problem.  Diagnoses and all orders for this visit:  Acute non-recurrent maxillary sinusitis (Primary)  -     amoxicillin-pot clavulanate (Augmentin) 875-125 mg tablet; Take 1 tablet by mouth 2 times a day for 10 days.  -     fluconazole (Diflucan) 150 mg tablet; Take 1 tablet (150 mg) by mouth 1 time for 1 dose. May  repeat at end of course of antibiotic  Acute bronchitis, unspecified organism  -     predniSONE (Deltasone) 10 mg tablet; Take 6 tablets (60 mg) by mouth once daily for 1 day, THEN 5 tablets (50 mg) once daily for 1 day, THEN 4 tablets (40 mg) once daily for 1 day, THEN 3 tablets (30 mg) once daily for 1 day, THEN 2 tablets (20 mg) once daily for 1 day, THEN 1 tablet (10 mg) once daily for 1 day.  -     albuterol 90 mcg/actuation inhaler; Inhale 2 puffs every 6 hours if needed for wheezing.    Encouraged patient to use otc flonase and follow up with ENT (already established) PRN as she states she has suffered from ear pain/pressure for the past year.  Patient's clinical presentation is otherwise unremarkable at this time. Patient is discharged with instructions to follow-up with primary care or seek emergency medical attention for worsening symptoms or any new concerns.    I did personally review Rosalba's past medical history, surgical history, social history, as well as family history (when relevant).  In this case, I also oversaw the her drug management by reviewing her medication list, allergy list, as well as the medications that I prescribed during the UC course and/or recommended as an out-patient (including possible OTC medications such as acetaminophen, NSAIDs , etc).    After reviewing the items above, I did look at previous medical documentation, such as recent hospitalizations, office visits, and/or recent consultations with PCP/specialist.                          SDOH:   Another factor that I considered in Rosalba's care was her Social Determinants of Health (SDOH). During this UC encounter, she did not have social determinants of health. Those SDOH influencing Rosalba's care are: none      Herrera Peña CNP  [facility]  [phone]

## 2024-11-21 NOTE — TELEPHONE ENCOUNTER
Patient called in to inform PCP that she was seen at urgent care today for worsening ear/sinus pressure. They gave her a RX for antibiotics and told her she should get imaging done of her sinus'. She is looking to see if PCP would do this or if she should just call her ENT that she is already established with for this. Please advise, thank you.

## 2024-11-21 NOTE — TELEPHONE ENCOUNTER
Informed patient ENT would be best. Voiced understanding. Informed office that the provider said she may have to go to [place] for imaging. If that is the case patient will be calling back in to see if she can get a referral. Plans to call ENT tomorrow.

## 2025-01-06 ENCOUNTER — TELEPHONE (OUTPATIENT)
Dept: CARDIOLOGY | Facility: CLINIC | Age: 45
End: 2025-01-06

## 2025-01-06 NOTE — TELEPHONE ENCOUNTER
Pt called on 1/2, states that her loop recorder battery is dead. She is asking if it should be replaced.     Discussed with Dr Ramicone and device clinic, [facility]OVITA reached on 5/25/2023. Last seen by Dr Ramicone on 2/21/22. JCR recommends following up in office and discuss possible loop replacement. He also suggested possibly Dr Lucas placing loop, pt lives in [place].     I called pt today and left a detailed VM, requested call back to further discuss.

## 2025-01-20 ENCOUNTER — OFFICE VISIT (OUTPATIENT)
Dept: OBSTETRICS AND GYNECOLOGY | Facility: CLINIC | Age: 45
End: 2025-01-20
Payer: MEDICARE

## 2025-01-20 DIAGNOSIS — B96.89 BV (BACTERIAL VAGINOSIS): ICD-10-CM

## 2025-01-20 DIAGNOSIS — N76.0 BV (BACTERIAL VAGINOSIS): ICD-10-CM

## 2025-01-20 DIAGNOSIS — N89.8 VAGINAL DISCHARGE: Primary | ICD-10-CM

## 2025-01-20 LAB
POC CLUE CELL WET PREP: PRESENT
POC TRICHOMONAS WET PREP: ABNORMAL
POC WBC WET PREP: ABNORMAL
POC YEAST CELLS WET PREP: ABNORMAL

## 2025-01-20 PROCEDURE — 87210 SMEAR WET MOUNT SALINE/INK: CPT | Performed by: OBSTETRICS & GYNECOLOGY

## 2025-01-20 PROCEDURE — 99213 OFFICE O/P EST LOW 20 MIN: CPT | Performed by: OBSTETRICS & GYNECOLOGY

## 2025-01-20 PROCEDURE — 99459 PELVIC EXAMINATION: CPT | Performed by: OBSTETRICS & GYNECOLOGY

## 2025-01-20 RX ORDER — METRONIDAZOLE 7.5 MG/G
GEL VAGINAL DAILY
Qty: 70 G | Refills: 0 | Status: SHIPPED | OUTPATIENT
Start: 2025-01-20 | End: 2025-01-25

## 2025-01-20 NOTE — PROGRESS NOTES
Rosalba Jenkins is a 44 y.o. year old female patient.  PCP = Jaren Mejia PA-C    Chief Complaint   Patient presents with    Vaginitis/Bacterial Vaginosis     Pt states she is having vaginal discharge. Pt denies any odor unless she wipes. Pt denies itching or burning.  Pt states she was on antibiotic x2 months ago LMP-hyster       HPI   Presents she has noticed a vaginal discharge with slight odor over the last several days.  Patient states she was on antibiotics about 2 months ago.  She had previous laparoscopically assisted vaginal hysterectomy and bilateral salpingo-oophorectomy performed on May 22, 2019 in [place].    OB History          1    Para   1    Term   0       1    AB   0    Living   1         SAB   0    IAB   0    Ectopic   0    Multiple   0    Live Births   1                 Past Medical History:   Diagnosis Date    Mild dysplasia of cervix 2010    10/16/2015    Personal history of transient ischemic attack (TIA), and cerebral infarction without residual deficits 01/15/2021    History of cerebrovascular accident       Past Surgical History:   Procedure Laterality Date    LAPAROSCOPIC ASSISTED RADICAL VAGINAL HYSTERECTOMY W/ NODE BIOPSY  2019    LA[facility] with bilateral oophorectomy    OTHER SURGICAL HISTORY  2019    Colonoscopy    OTHER SURGICAL HISTORY  2019    Laparoscopy    OTHER SURGICAL HISTORY  2019    Tonsillectomy    OTHER SURGICAL HISTORY  2019    Colposcopy    OTHER SURGICAL HISTORY  2019    Ovarian cystectomy    OTHER SURGICAL HISTORY  2019    Hysteroscopy    OTHER SURGICAL HISTORY  2020    Loop recorder insertion    TUBAL LIGATION Bilateral 2015    with Essure       Review of Systems:   Constitutional: No fever or chills  Respiratory: No shortness of breath, or cough  Cardiovascular: No chest pain or syncope  Breasts: No breast pain, no masses, no nipple discharge  Gastrointestinal: No nausea, vomiting, or diarrhea, no  abdominal pain  Genitourinary: No dysuria or frequency  Gynecology: Negative except as noted in history of present illness  All other: All other systems reviewed and negative for complaint    Medication Documentation Review Audit       Reviewed by Kameron Rolle MD (Physician) on 25 at 1452      Medication Order Taking? Sig Documenting Provider Last Dose Status   albuterol 90 mcg/actuation inhaler [id number]  Inhale 2 puffs every 6 hours if needed for wheezing. Herrera Peña, APRN-CNP  Active   cetirizine (ZyrTEC) 10 mg tablet [id number]  Take 1 tablet (10 mg) by mouth once daily. Jaren Mejia PA-C  Active   fluticasone (Flonase) 50 mcg/actuation nasal spray [id number]  Administer 1 spray into each nostril once daily. Shake gently. Before first use, prime pump. After use, clean tip and replace cap. Jaren Mejia PA-C  Active   metoclopramide (Reglan) 10 mg tablet [id number]  Take 1 tablet (10 mg) by mouth 2 times a day as needed (nausea) for up to 10 days. Jaren Mejia PA-C   10/03/24 5609                     There were no vitals taken for this visit.    PHYSICAL EXAMINATION:  Chaperone present for exam: Nicole Wright MA ([place])  PHYSICAL EXAMINATION:  Well-developed, well nourished, in no acute distress, alert and oriented x three, is pleasant and cooperative.   HEENT: Clear. Pupils equal, round and reactive to light and accommodation. Extraocular muscles are intact. Oral mucosa pink without exudate.   NECK: No lymphadenopathy, no thyromegaly.  LUNGS: Clear bilaterally.  HEART: Regular rate and rhythm without murmurs.  ABDOMEN: Normoactive bowel sounds, soft and nontender, no guarding or rebound tenderness, no CVA tenderness.  EXTREMITIES: No clubbing, cyanosis or edema.  NEUROLOGIC:  Cranial nerves II-XII grossly intact.  :  Normal external female genitalia.  Normal vulva and vagina. Normal urethral meatus, urethra and bladder. Noted surgical absence of the cervix and uterus. Well-healed  vaginal cuff.  Noted white vaginal discharge.  Wet prep is positive for bacterial vaginosis, negative for trichomonas or yeast.        Problem List Items Addressed This Visit    None  Visit Diagnoses       Vaginal discharge    -  Primary    Relevant Orders    POCT Wet Mount manually resulted (Completed)    BV (bacterial vaginosis)        Relevant Medications    metroNIDAZOLE (Metrogel) 0.75 % (37.5mg/5 gram) vaginal gel             Provider Impression:  1.  Bacterial vaginosis  Prescription for MetroGel vaginal.  Patient to return in 6 months for annual exam.

## 2025-01-23 ENCOUNTER — LAB (OUTPATIENT)
Dept: LAB | Facility: LAB | Age: 45
End: 2025-01-23
Payer: COMMERCIAL

## 2025-01-23 DIAGNOSIS — E53.8 VITAMIN B 12 DEFICIENCY: ICD-10-CM

## 2025-01-23 DIAGNOSIS — E55.9 VITAMIN D DEFICIENCY: ICD-10-CM

## 2025-01-23 LAB
25(OH)D3 SERPL-MCNC: 29 NG/ML (ref 30–100)
VIT B12 SERPL-MCNC: 147 PG/ML (ref 211–911)

## 2025-01-23 PROCEDURE — 82607 VITAMIN B-12: CPT

## 2025-01-23 PROCEDURE — 82306 VITAMIN D 25 HYDROXY: CPT

## 2025-01-24 ENCOUNTER — CLINICAL SUPPORT (OUTPATIENT)
Dept: PRIMARY CARE | Facility: CLINIC | Age: 45
End: 2025-01-24
Payer: MEDICARE

## 2025-01-24 DIAGNOSIS — E53.8 VITAMIN B 12 DEFICIENCY: ICD-10-CM

## 2025-01-24 PROCEDURE — 96372 THER/PROPH/DIAG INJ SC/IM: CPT

## 2025-01-24 RX ORDER — CYANOCOBALAMIN 1000 UG/ML
1000 INJECTION, SOLUTION INTRAMUSCULAR; SUBCUTANEOUS ONCE
Status: COMPLETED | OUTPATIENT
Start: 2025-01-24 | End: 2025-01-24

## 2025-01-24 RX ADMIN — CYANOCOBALAMIN 1000 MCG: 1000 INJECTION, SOLUTION INTRAMUSCULAR; SUBCUTANEOUS at 14:58

## 2025-01-30 ENCOUNTER — CLINICAL SUPPORT (OUTPATIENT)
Dept: PRIMARY CARE | Facility: CLINIC | Age: 45
End: 2025-01-30
Payer: COMMERCIAL

## 2025-01-30 DIAGNOSIS — E53.8 VITAMIN B 12 DEFICIENCY: ICD-10-CM

## 2025-01-30 PROCEDURE — 96372 THER/PROPH/DIAG INJ SC/IM: CPT

## 2025-01-30 RX ORDER — CYANOCOBALAMIN 1000 UG/ML
1000 INJECTION, SOLUTION INTRAMUSCULAR; SUBCUTANEOUS ONCE
Status: COMPLETED | OUTPATIENT
Start: 2025-01-30 | End: 2025-01-30

## 2025-01-30 RX ADMIN — CYANOCOBALAMIN 1000 MCG: 1000 INJECTION, SOLUTION INTRAMUSCULAR; SUBCUTANEOUS at 16:52

## 2025-01-31 ENCOUNTER — APPOINTMENT (OUTPATIENT)
Dept: PRIMARY CARE | Facility: CLINIC | Age: 45
End: 2025-01-31
Payer: MEDICARE

## 2025-02-06 ENCOUNTER — APPOINTMENT (OUTPATIENT)
Dept: PRIMARY CARE | Facility: CLINIC | Age: 45
End: 2025-02-06
Payer: MEDICARE

## 2025-03-25 ENCOUNTER — APPOINTMENT (OUTPATIENT)
Dept: PRIMARY CARE | Facility: CLINIC | Age: 45
End: 2025-03-25
Payer: MEDICARE

## 2025-04-09 ENCOUNTER — TELEPHONE (OUTPATIENT)
Dept: CARDIOLOGY | Facility: CLINIC | Age: 45
End: 2025-04-09
Payer: MEDICARE

## 2025-04-09 NOTE — TELEPHONE ENCOUNTER
Called pt, scheduled her for NPV with Dr Lucas on 5/15 at [facility]. [place] is closest for pt. Pt accepts appt.

## 2025-04-09 NOTE — TELEPHONE ENCOUNTER
PATIENTS CALLED IN REGARDING THE LAST PHONE MESSAGE SHE WITH THE NURSE SHE WILL LIKE TO BE SEEN SOONER BECAUSE HER LOOP RECORD IS DEAD Pt called on 1/2, states that her loop recorder battery is dead. She is asking if it should be replaced.      Discussed with Dr Ramicone and device clinic, [facility] reached on 5/25/2023. Last seen by Dr Ramicone on 2/21/22. JCR recommends following up in office and discuss possible loop replacement. He also suggested possibly Dr Lance cardenas loop, pt lives in [place].      I called pt today and left a detailed VM, requested call back to further discuss.

## 2025-05-01 PROBLEM — M23.90 DERANGEMENT OF KNEE: Status: RESOLVED | Noted: 2023-06-12 | Resolved: 2025-05-01

## 2025-05-01 PROBLEM — R11.2 NAUSEA AND VOMITING: Status: RESOLVED | Noted: 2023-06-12 | Resolved: 2025-05-01

## 2025-05-01 PROBLEM — K52.9 COLITIS: Status: RESOLVED | Noted: 2023-06-12 | Resolved: 2025-05-01

## 2025-05-01 PROBLEM — R89.9 ABNORMAL LABORATORY TEST: Status: RESOLVED | Noted: 2023-06-12 | Resolved: 2025-05-01

## 2025-05-01 PROBLEM — I34.1 MITRAL VALVE PROLAPSE: Status: ACTIVE | Noted: 2025-05-01

## 2025-05-01 PROBLEM — B37.31 YEAST VAGINITIS: Status: RESOLVED | Noted: 2023-06-12 | Resolved: 2025-05-01

## 2025-05-01 NOTE — PROGRESS NOTES
THIS VISIT HAS BEEN ADMINISTRATIVELY CLOSED BECAUSE THE PROVIDER RESPONSIBLE FOR FURTHER COMPLETION IS NO LONGER AVAILABLE.

## 2025-05-15 ENCOUNTER — APPOINTMENT (OUTPATIENT)
Dept: CARDIOLOGY | Facility: CLINIC | Age: 45
End: 2025-05-15
Payer: MEDICARE

## 2025-05-15 VITALS
HEIGHT: 65 IN | DIASTOLIC BLOOD PRESSURE: 67 MMHG | HEART RATE: 77 BPM | WEIGHT: 112.2 LBS | OXYGEN SATURATION: 94 % | SYSTOLIC BLOOD PRESSURE: 91 MMHG | BODY MASS INDEX: 18.69 KG/M2

## 2025-05-15 DIAGNOSIS — Z45.09 ENCOUNTER FOR LOOP RECORDER AT END OF BATTERY LIFE: ICD-10-CM

## 2025-05-15 DIAGNOSIS — R00.1 BRADYCARDIA: ICD-10-CM

## 2025-05-15 DIAGNOSIS — Z95.818 STATUS POST PLACEMENT OF IMPLANTABLE LOOP RECORDER: ICD-10-CM

## 2025-05-15 DIAGNOSIS — R53.83 OTHER FATIGUE: ICD-10-CM

## 2025-05-15 DIAGNOSIS — R00.2 HEART PALPITATIONS: ICD-10-CM

## 2025-05-15 DIAGNOSIS — I49.5 SICK SINUS SYNDROME DUE TO SA NODE DYSFUNCTION (MULTI): Primary | ICD-10-CM

## 2025-05-15 NOTE — PROGRESS NOTES
"    Cardiac Electrophysiology Office Visit     Referred by Dr. Brown ref. provider found for   Chief Complaint   Patient presents with    Discuss loop recorder removal     NPV  Battery dead    Numbness     Tingling- BUE and BLE     HPI:  Rosalba Jenkins is a 44 y.o. year old female patient with h/o CVA, Sinus bradycardia presenting today to establish care    Objective  Current Outpatient Medications   Medication Instructions    albuterol 90 mcg/actuation inhaler 2 puffs, inhalation, Every 6 hours PRN    cetirizine (ZYRTEC) 10 mg, oral, Daily    fluticasone (Flonase) 50 mcg/actuation nasal spray 1 spray, Each Nostril, Daily, Shake gently. Before first use, prime pump. After use, clean tip and replace cap.    metoclopramide (REGLAN) 10 mg, oral, 2 times daily PRN         Visit Vitals  BP 91/67   Pulse 77   Ht 1.651 m (5' 5\")   Wt 50.9 kg (112 lb 3.2 oz)   SpO2 94%   BMI 18.67 kg/m²   OB Status Hysterectomy   Smoking Status Every Day   BSA 1.53 m²      Physical Exam  Vitals reviewed.   Constitutional:       Appearance: Normal appearance.   HENT:      Head: Normocephalic.   Cardiovascular:      Rate and Rhythm: Normal rate and regular rhythm.   Pulmonary:      Effort: Pulmonary effort is normal. No respiratory distress.      Breath sounds: No wheezing.   Skin:     General: Skin is warm and dry.      Capillary Refill: Capillary refill takes less than 2 seconds.   Neurological:      Mental Status: She is alert.   Psychiatric:         Mood and Affect: Mood normal.         My Interpretation of Reviewed Study(s):  Results  DIAGNOSTIC  Echocardiogram: EF 50%, no significant pericardial effusion, no valvular pathology, small PFO (01/2020)     Assessment & Plan  Sinus bradycardia  Sinus bradycardia with nocturnal episodes, heart rates as low as 28 bpm, without daytime symptoms. No atrial fibrillation detected on loop recorder over three years. Current bradycardia is primarily nocturnal and asymptomatic during the day, making " pacemaker implantation less beneficial. Loop recorder battery is depleted, replacement unnecessary.  - Explant loop recorder on June 24, 2025, with sedation due to patient preference.  - Monitor heart rate and symptoms post-explantation.    Cryptogenic Stroke  Remote stroke, possibly related to small PFO. No residual neurological deficits. Concerns about recurrent stroke due to bradycardia and potential PFO-related embolic events. Advised on baby aspirin for stroke prevention and precautions during long travels to prevent clot formation.  - Initiate baby aspirin for stroke prevention.  - Advise on precautions during long travels to prevent clot formation.    Return to Clinic: Patient should return to the EP Clinic in 6 months    Ghassan Lucas MD [facility]  Cardiac Electrophysiology  [email or web address]    **Disclaimer: This note was dictated by speech recognition, and every effort has been made to prevent any error in transcription, however minor errors may be present**    This medical note was created with the assistance of artificial intelligence (AI) for documentation purposes. The content has been reviewed and confirmed by the healthcare provider for accuracy and completeness. Patient consented to the use of audio recording and use of AI during their visit.

## 2025-06-21 LAB
25(OH)D3+25(OH)D2 SERPL-MCNC: 22 NG/ML (ref 30–100)
ANION GAP SERPL CALCULATED.4IONS-SCNC: 11 MMOL/L (CALC) (ref 7–17)
BUN SERPL-MCNC: 10 MG/DL (ref 7–25)
BUN/CREAT SERPL: ABNORMAL (CALC) (ref 6–22)
CALCIUM SERPL-MCNC: 9.1 MG/DL (ref 8.6–10.2)
CHLORIDE SERPL-SCNC: 105 MMOL/L (ref 98–110)
CO2 SERPL-SCNC: 25 MMOL/L (ref 20–32)
CREAT SERPL-MCNC: 0.77 MG/DL (ref 0.5–0.99)
EGFRCR SERPLBLD CKD-EPI 2021: 96 ML/MIN/1.73M2
ERYTHROCYTE [DISTWIDTH] IN BLOOD BY AUTOMATED COUNT: 12.5 % (ref 11–15)
GLUCOSE SERPL-MCNC: 129 MG/DL (ref 65–99)
HCT VFR BLD AUTO: 41.7 % (ref 35–45)
HGB BLD-MCNC: 14 G/DL (ref 11.7–15.5)
MCH RBC QN AUTO: 32.4 PG (ref 27–33)
MCHC RBC AUTO-ENTMCNC: 33.6 G/DL (ref 32–36)
MCV RBC AUTO: 96.5 FL (ref 80–100)
PLATELET # BLD AUTO: 173 THOUSAND/UL (ref 140–400)
PMV BLD REES-ECKER: 11.1 FL (ref 7.5–12.5)
POTASSIUM SERPL-SCNC: 3.4 MMOL/L (ref 3.5–5.3)
RBC # BLD AUTO: 4.32 MILLION/UL (ref 3.8–5.1)
SODIUM SERPL-SCNC: 141 MMOL/L (ref 135–146)
VIT B12 SERPL-MCNC: 214 PG/ML (ref 200–1100)
WBC # BLD AUTO: 5.2 THOUSAND/UL (ref 3.8–10.8)

## 2025-06-24 ENCOUNTER — HOSPITAL ENCOUNTER (OUTPATIENT)
Facility: HOSPITAL | Age: 45
Setting detail: OUTPATIENT SURGERY
Discharge: HOME | End: 2025-06-24
Attending: STUDENT IN AN ORGANIZED HEALTH CARE EDUCATION/TRAINING PROGRAM | Admitting: STUDENT IN AN ORGANIZED HEALTH CARE EDUCATION/TRAINING PROGRAM
Payer: MEDICARE

## 2025-06-24 VITALS
SYSTOLIC BLOOD PRESSURE: 104 MMHG | OXYGEN SATURATION: 100 % | RESPIRATION RATE: 14 BRPM | HEIGHT: 65 IN | HEART RATE: 49 BPM | DIASTOLIC BLOOD PRESSURE: 61 MMHG | WEIGHT: 112.21 LBS | BODY MASS INDEX: 18.7 KG/M2

## 2025-06-24 DIAGNOSIS — Z95.818 STATUS POST PLACEMENT OF IMPLANTABLE LOOP RECORDER: ICD-10-CM

## 2025-06-24 DIAGNOSIS — Z45.09 ENCOUNTER FOR LOOP RECORDER AT END OF BATTERY LIFE: ICD-10-CM

## 2025-06-24 PROCEDURE — 7100000010 HC PHASE TWO TIME - EACH INCREMENTAL 1 MINUTE: Performed by: STUDENT IN AN ORGANIZED HEALTH CARE EDUCATION/TRAINING PROGRAM

## 2025-06-24 PROCEDURE — 33286 RMVL SUBQ CAR RHYTHM MNTR: CPT | Performed by: STUDENT IN AN ORGANIZED HEALTH CARE EDUCATION/TRAINING PROGRAM

## 2025-06-24 PROCEDURE — 76937 US GUIDE VASCULAR ACCESS: CPT

## 2025-06-24 PROCEDURE — 99152 MOD SED SAME PHYS/QHP 5/>YRS: CPT | Performed by: STUDENT IN AN ORGANIZED HEALTH CARE EDUCATION/TRAINING PROGRAM

## 2025-06-24 PROCEDURE — 7100000009 HC PHASE TWO TIME - INITIAL BASE CHARGE: Performed by: STUDENT IN AN ORGANIZED HEALTH CARE EDUCATION/TRAINING PROGRAM

## 2025-06-24 PROCEDURE — 2500000004 HC RX 250 GENERAL PHARMACY W/ HCPCS (ALT 636 FOR OP/ED): Performed by: STUDENT IN AN ORGANIZED HEALTH CARE EDUCATION/TRAINING PROGRAM

## 2025-06-24 RX ORDER — LIDOCAINE HYDROCHLORIDE AND EPINEPHRINE 10; 10 UG/ML; MG/ML
INJECTION, SOLUTION INFILTRATION; PERINEURAL AS NEEDED
Status: DISCONTINUED | OUTPATIENT
Start: 2025-06-24 | End: 2025-06-24 | Stop reason: HOSPADM

## 2025-06-24 RX ORDER — MIDAZOLAM HYDROCHLORIDE 1 MG/ML
INJECTION, SOLUTION INTRAMUSCULAR; INTRAVENOUS AS NEEDED
Status: DISCONTINUED | OUTPATIENT
Start: 2025-06-24 | End: 2025-06-24 | Stop reason: HOSPADM

## 2025-06-24 RX ORDER — FENTANYL CITRATE 50 UG/ML
INJECTION, SOLUTION INTRAMUSCULAR; INTRAVENOUS AS NEEDED
Status: DISCONTINUED | OUTPATIENT
Start: 2025-06-24 | End: 2025-06-24 | Stop reason: HOSPADM

## 2025-06-24 ASSESSMENT — PAIN - FUNCTIONAL ASSESSMENT
PAIN_FUNCTIONAL_ASSESSMENT: 0-10

## 2025-06-24 ASSESSMENT — PAIN SCALES - GENERAL
PAINLEVEL_OUTOF10: 0 - NO PAIN

## 2025-06-24 NOTE — POST-PROCEDURE NOTE
"Physician Transition of Care Summary  Cardiac Electrophysiology Lab/OR    Procedure Date: 6/24/2025  Attending:    Lesli Lucas - Primary    Resident/Fellow/Other Assistant: Surgeons and Role:  * No surgeons found with a matching role *    Indications:   Pre-op Diagnosis      * Status post placement of implantable loop recorder [Z95.818]     * Encounter for loop recorder at end of battery life [Z45.09]    Post-procedure diagnosis:   Post-op Diagnosis     * Status post placement of implantable loop recorder [Z95.818]     * Encounter for loop recorder at end of battery life [Z45.09]    Procedure(s):   Loop Recorder Explant  12053 - OK REMOVAL SUBCUTANEOUS CARDIAC RHYTHM MONITOR      Summary:  Successful explant of a [employer] Implantable Loop recorder    Recommendations:  Tentatively patient will be discharged Today, following bed rest and subsequent ambulation, provided the recovery parameters are appropriate.     Patient Instructions:  No showering for 24hrs  Remove Bandage in 24-48 hours  Avoid submerging in water, swimming bath tubs etc for 5-7days  Please call our office ([facility]: [phone]) if you notice any discharge or swelling around incision or fever.    For full procedure note/study review please go to \"Chart review\" --> \"Cardiology\" tab --> Electrophysiology procedure for 6/24/2025    Complications:   None    Stents/Implants:   Implants       No implant documentation for this case.            Anticoagulation/Antiplatelet Plan:   N/a    Estimated Blood Loss:   * No values recorded between 5/31/2024  2:17 PM and 5/31/2024  7:50 PM *    Anesthesia: Moderate Sedation Anesthesia Staff: No anesthesia staff entered.    Any Specimen(s) Removed:   No specimens collected during this procedure.    Disposition:   Monitor for 1hrs post anesthesia and bed rest, and plan for Discharge home on 6/24/25    Ghassan Lucas MD [facility]  Cardiac Electrophysiology    **Disclaimer: This note was dictated by speech " recognition, and every effort has been made to prevent any error in transcription, however minor errors may be present**

## 2025-06-24 NOTE — H&P
History Of Present Illness  Rosalba Jenkins is a 44 y.o. female presenting for loop recorder explant.    She has a history of sinus bradycardia and cryptogenic stroke status post implantable loop recorder.  There were no episodes of atrial fibrillation detected on loop recorder over 3 years.  Patient is bradycardic primarily at night and asymptomatic during the day.  Loop recorder battery is now depleted and after discussion with the physiatrist Dr. Lucas it was determined that reimplant was unnecessary.  Due to risk of discomfort with explant procedure with planned under conscious sedation.     Past Medical History  Medical History[1]    Surgical History  Surgical History[2]     Social History  She reports that she has been smoking cigarettes. She has never used smokeless tobacco. She reports current alcohol use. She reports that she does not currently use drugs.    Family History  Family History[3]     Allergies  Hydrocodone-acetaminophen, Hydrocodone-guaifenesin, and Latex    Review of Systems   All other systems reviewed and are negative.       Physical Exam  Vitals and nursing note reviewed.   Constitutional:       General: She is not in acute distress.  HENT:      Head: Normocephalic and atraumatic.      Mouth/Throat:      Mouth: Mucous membranes are moist.      Pharynx: Oropharynx is clear.   Eyes:      General: No scleral icterus.     Pupils: Pupils are equal, round, and reactive to light.   Cardiovascular:      Rate and Rhythm: Normal rate and regular rhythm.      Pulses: Normal pulses.      Heart sounds: Normal heart sounds, S1 normal and S2 normal. No murmur heard.     No friction rub.   Pulmonary:      Effort: Pulmonary effort is normal.      Breath sounds: Normal breath sounds.   Abdominal:      General: Bowel sounds are normal. There is no distension.      Palpations: Abdomen is soft.      Tenderness: There is no abdominal tenderness.   Musculoskeletal:         General: Normal range of motion.       Cervical back: Normal range of motion and neck supple.      Right lower leg: No edema.      Left lower leg: No edema.   Skin:     General: Skin is warm and dry.      Capillary Refill: Capillary refill takes less than 2 seconds.      Findings: No rash.   Neurological:      General: No focal deficit present.      Mental Status: She is alert.   Psychiatric:         Mood and Affect: Mood normal.         Behavior: Behavior normal.          Last Recorded Vitals  There were no vitals taken for this visit.    Relevant Results           Assessment & Plan  Encounter for loop recorder at end of battery life    Status post placement of implantable loop recorder      Implantable loop recorder battery depletion  - Patient is appropriate and agreeable to proceed with planned loop recorder explantation under conscious sedation with Dr. Lucas.        Stefan Henry, APRN-CNP         [1]   Past Medical History:  Diagnosis Date    Mild dysplasia of cervix 06/2010    10/16/2015    Personal history of transient ischemic attack (TIA), and cerebral infarction without residual deficits 01/15/2021    History of cerebrovascular accident   [2]   Past Surgical History:  Procedure Laterality Date    LAPAROSCOPIC ASSISTED RADICAL VAGINAL HYSTERECTOMY W/ NODE BIOPSY  05/22/2019    LA[facility]H with bilateral oophorectomy    OTHER SURGICAL HISTORY  12/21/2019    Colonoscopy    OTHER SURGICAL HISTORY  12/21/2019    Laparoscopy    OTHER SURGICAL HISTORY  12/21/2019    Tonsillectomy    OTHER SURGICAL HISTORY  12/21/2019    Colposcopy    OTHER SURGICAL HISTORY  12/21/2019    Ovarian cystectomy    OTHER SURGICAL HISTORY  12/21/2019    Hysteroscopy    OTHER SURGICAL HISTORY  06/24/2020    Loop recorder insertion    TUBAL LIGATION Bilateral 11/19/2015    with Ada   [3]   Family History  Problem Relation Name Age of Onset    Hypertension Mother      Diabetes Mother      Hyperlipidemia Mother      Heart disease Mother      Heart disease Father       Hypertension Father      Hyperlipidemia Father

## 2025-06-24 NOTE — Clinical Note
Patient Clipped and Prepped: chest. Prepped with ChloraPrep, a minimum of 3 minute dry time, longer if needed, no pooling noted, patient draped in sterile fashion. Principal Discharge DX:	Epigastric pain

## 2025-06-24 NOTE — NURSING NOTE
Patient discharged via wheelchair to private vehicle.    Home going instructions specific to procedure given: Yes    1 Easily Arousable; Responding Appropriately: Yes    2. VS +/- 20% of preprocedure status: Yes     3. Significant complications are absent: Yes    4. Ambulates without dizziness/Age appropriate activity: Yes    5. Pulse Ox greater than or equal to 92% or above on room air /ordered oxygen treatment: Yes    6. Care Plan Complete: Yes    7. Discharge education completed and information provided to patient and family: Yes    8. If Patient had PCI performed-Stent card sent home with Patient: N/A    Patient and family have no further questions at this time. Gave patient and family department number and office number if any questions should arise.

## 2025-07-15 ENCOUNTER — APPOINTMENT (OUTPATIENT)
Dept: OBSTETRICS AND GYNECOLOGY | Facility: CLINIC | Age: 45
End: 2025-07-15
Payer: MEDICARE

## 2025-08-05 DIAGNOSIS — Z12.31 ENCOUNTER FOR SCREENING MAMMOGRAM FOR BREAST CANCER: ICD-10-CM

## 2025-08-08 ENCOUNTER — TELEPHONE (OUTPATIENT)
Dept: PRIMARY CARE | Facility: CLINIC | Age: 45
End: 2025-08-08

## 2025-08-08 ENCOUNTER — CLINICAL SUPPORT (OUTPATIENT)
Dept: PRIMARY CARE | Facility: CLINIC | Age: 45
End: 2025-08-08
Payer: MEDICARE

## 2025-08-08 DIAGNOSIS — E53.8 VITAMIN B 12 DEFICIENCY: ICD-10-CM

## 2025-08-08 PROCEDURE — 96372 THER/PROPH/DIAG INJ SC/IM: CPT

## 2025-08-08 RX ORDER — CYANOCOBALAMIN 1000 UG/ML
1000 INJECTION, SOLUTION INTRAMUSCULAR; SUBCUTANEOUS ONCE
Status: COMPLETED | OUTPATIENT
Start: 2025-08-08 | End: 2025-08-08

## 2025-08-08 RX ADMIN — CYANOCOBALAMIN 1000 MCG: 1000 INJECTION, SOLUTION INTRAMUSCULAR; SUBCUTANEOUS at 16:41

## 2025-11-20 ENCOUNTER — APPOINTMENT (OUTPATIENT)
Dept: CARDIOLOGY | Facility: CLINIC | Age: 45
End: 2025-11-20
Payer: MEDICARE